# Patient Record
Sex: MALE | Race: WHITE | NOT HISPANIC OR LATINO | Employment: OTHER | URBAN - METROPOLITAN AREA
[De-identification: names, ages, dates, MRNs, and addresses within clinical notes are randomized per-mention and may not be internally consistent; named-entity substitution may affect disease eponyms.]

---

## 2018-10-23 ENCOUNTER — HOSPITAL ENCOUNTER (OUTPATIENT)
Dept: ULTRASOUND IMAGING | Facility: CLINIC | Age: 68
Discharge: HOME | End: 2018-10-23
Attending: ORTHOPAEDIC SURGERY | Admitting: ORTHOPAEDIC SURGERY
Payer: MEDICARE

## 2018-10-23 DIAGNOSIS — R22.41 LOCALIZED SWELLING, MASS, OR LUMP OF RIGHT LOWER EXTREMITY: ICD-10-CM

## 2018-10-23 DIAGNOSIS — I82.409 DVT (DEEP VENOUS THROMBOSIS) (H): ICD-10-CM

## 2018-10-23 DIAGNOSIS — I82.401: ICD-10-CM

## 2018-10-23 PROCEDURE — 93971 EXTREMITY STUDY: CPT | Mod: RT

## 2020-09-13 ENCOUNTER — APPOINTMENT (OUTPATIENT)
Dept: CT IMAGING | Facility: CLINIC | Age: 70
End: 2020-09-13
Attending: EMERGENCY MEDICINE
Payer: COMMERCIAL

## 2020-09-13 ENCOUNTER — HOSPITAL ENCOUNTER (EMERGENCY)
Facility: CLINIC | Age: 70
Discharge: HOME | End: 2020-09-13
Attending: EMERGENCY MEDICINE | Admitting: EMERGENCY MEDICINE
Payer: COMMERCIAL

## 2020-09-13 ENCOUNTER — APPOINTMENT (OUTPATIENT)
Dept: ULTRASOUND IMAGING | Facility: CLINIC | Age: 70
End: 2020-09-13
Attending: EMERGENCY MEDICINE
Payer: COMMERCIAL

## 2020-09-13 VITALS
OXYGEN SATURATION: 94 % | TEMPERATURE: 98.3 F | DIASTOLIC BLOOD PRESSURE: 62 MMHG | SYSTOLIC BLOOD PRESSURE: 119 MMHG | RESPIRATION RATE: 16 BRPM | HEART RATE: 92 BPM

## 2020-09-13 DIAGNOSIS — E11.9 TYPE 2 DIABETES MELLITUS WITHOUT COMPLICATION, WITHOUT LONG-TERM CURRENT USE OF INSULIN (H): ICD-10-CM

## 2020-09-13 DIAGNOSIS — N43.3 BILATERAL HYDROCELE: ICD-10-CM

## 2020-09-13 DIAGNOSIS — D64.9 ANEMIA, UNSPECIFIED TYPE: ICD-10-CM
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
## 2020-09-13 DIAGNOSIS — F10.220 ACUTE ALCOHOLIC INTOXICATION IN ALCOHOLISM WITHOUT COMPLICATION (H): ICD-10-CM

## 2020-09-13 DIAGNOSIS — S01.01XA LACERATION OF SCALP, INITIAL ENCOUNTER: ICD-10-CM

## 2020-09-13 DIAGNOSIS — R60.0 LEG EDEMA, LEFT: ICD-10-CM

## 2020-09-13 LAB
ALBUMIN SERPL-MCNC: 2.8 G/DL (ref 3.4–5)
ALBUMIN UR-MCNC: 10 MG/DL
ALP SERPL-CCNC: 203 U/L (ref 40–150)
ALT SERPL W P-5'-P-CCNC: 38 U/L (ref 0–70)
ANION GAP SERPL CALCULATED.3IONS-SCNC: 8 MMOL/L (ref 3–14)
APPEARANCE UR: CLEAR
AST SERPL W P-5'-P-CCNC: 35 U/L (ref 0–45)
BASOPHILS # BLD AUTO: 0 10E9/L (ref 0–0.2)
BASOPHILS NFR BLD AUTO: 0.1 %
BILIRUB SERPL-MCNC: 0.2 MG/DL (ref 0.2–1.3)
BILIRUB UR QL STRIP: NEGATIVE
BUN SERPL-MCNC: 11 MG/DL (ref 7–30)
CALCIUM SERPL-MCNC: 8.5 MG/DL (ref 8.5–10.1)
CHLORIDE SERPL-SCNC: 105 MMOL/L (ref 94–109)
CO2 SERPL-SCNC: 22 MMOL/L (ref 20–32)
COLOR UR AUTO: YELLOW
CREAT SERPL-MCNC: 0.87 MG/DL (ref 0.66–1.25)
DIFFERENTIAL METHOD BLD: ABNORMAL
EOSINOPHIL # BLD AUTO: 0 10E9/L (ref 0–0.7)
EOSINOPHIL NFR BLD AUTO: 0.1 %
ERYTHROCYTE [DISTWIDTH] IN BLOOD BY AUTOMATED COUNT: 20.3 % (ref 10–15)
ETHANOL SERPL-MCNC: 0.19 G/DL
GFR SERPL CREATININE-BSD FRML MDRD: 88 ML/MIN/{1.73_M2}
GLUCOSE SERPL-MCNC: 121 MG/DL (ref 70–99)
GLUCOSE UR STRIP-MCNC: 70 MG/DL
HCT VFR BLD AUTO: 33.7 % (ref 40–53)
HGB BLD-MCNC: 10.2 G/DL (ref 13.3–17.7)
HGB UR QL STRIP: NEGATIVE
HYALINE CASTS #/AREA URNS LPF: 3 /LPF (ref 0–2)
IMM GRANULOCYTES # BLD: 0 10E9/L (ref 0–0.4)
IMM GRANULOCYTES NFR BLD: 0.2 %
KETONES UR STRIP-MCNC: 10 MG/DL
LEUKOCYTE ESTERASE UR QL STRIP: NEGATIVE
LYMPHOCYTES # BLD AUTO: 1.1 10E9/L (ref 0.8–5.3)
LYMPHOCYTES NFR BLD AUTO: 11.3 %
MCH RBC QN AUTO: 20.4 PG (ref 26.5–33)
MCHC RBC AUTO-ENTMCNC: 30.3 G/DL (ref 31.5–36.5)
MCV RBC AUTO: 67 FL (ref 78–100)
MONOCYTES # BLD AUTO: 0.7 10E9/L (ref 0–1.3)
MONOCYTES NFR BLD AUTO: 7.2 %
MUCOUS THREADS #/AREA URNS LPF: PRESENT /LPF
NEUTROPHILS # BLD AUTO: 7.8 10E9/L (ref 1.6–8.3)
NEUTROPHILS NFR BLD AUTO: 81.1 %
NITRATE UR QL: NEGATIVE
NRBC # BLD AUTO: 0 10*3/UL
NRBC BLD AUTO-RTO: 0 /100
NT-PROBNP SERPL-MCNC: 149 PG/ML (ref 0–900)
PH UR STRIP: 5.5 PH (ref 5–7)
PLATELET # BLD AUTO: 287 10E9/L (ref 150–450)
POTASSIUM SERPL-SCNC: 4.7 MMOL/L (ref 3.4–5.3)
PROT SERPL-MCNC: 5.2 G/DL (ref 6.8–8.8)
RBC # BLD AUTO: 5.01 10E12/L (ref 4.4–5.9)
RBC #/AREA URNS AUTO: 0 /HPF (ref 0–2)
SODIUM SERPL-SCNC: 135 MMOL/L (ref 133–144)
SOURCE: ABNORMAL
SP GR UR STRIP: 1.02 (ref 1–1.03)
TROPONIN I SERPL-MCNC: <0.015 UG/L (ref 0–0.04)
UROBILINOGEN UR STRIP-MCNC: NORMAL MG/DL (ref 0–2)
WBC # BLD AUTO: 9.7 10E9/L (ref 4–11)
WBC #/AREA URNS AUTO: <1 /HPF (ref 0–5)

## 2020-09-13 PROCEDURE — 93971 EXTREMITY STUDY: CPT | Mod: LT

## 2020-09-13 PROCEDURE — 80053 COMPREHEN METABOLIC PANEL: CPT | Performed by: EMERGENCY MEDICINE

## 2020-09-13 PROCEDURE — 80320 DRUG SCREEN QUANTALCOHOLS: CPT | Performed by: EMERGENCY MEDICINE

## 2020-09-13 PROCEDURE — 70450 CT HEAD/BRAIN W/O DYE: CPT

## 2020-09-13 PROCEDURE — 84484 ASSAY OF TROPONIN QUANT: CPT | Performed by: EMERGENCY MEDICINE

## 2020-09-13 PROCEDURE — 99285 EMERGENCY DEPT VISIT HI MDM: CPT | Mod: 25

## 2020-09-13 PROCEDURE — 81001 URINALYSIS AUTO W/SCOPE: CPT | Performed by: EMERGENCY MEDICINE

## 2020-09-13 PROCEDURE — 76870 US EXAM SCROTUM: CPT

## 2020-09-13 PROCEDURE — 83880 ASSAY OF NATRIURETIC PEPTIDE: CPT | Performed by: EMERGENCY MEDICINE

## 2020-09-13 PROCEDURE — 85025 COMPLETE CBC W/AUTO DIFF WBC: CPT | Performed by: EMERGENCY MEDICINE

## 2020-09-13 PROCEDURE — 25000132 ZZH RX MED GY IP 250 OP 250 PS 637: Performed by: EMERGENCY MEDICINE

## 2020-09-13 RX ORDER — FOLIC ACID 1 MG/1
1 TABLET ORAL ONCE
Status: COMPLETED | OUTPATIENT
Start: 2020-09-13 | End: 2020-09-13

## 2020-09-13 RX ORDER — LANOLIN ALCOHOL/MO/W.PET/CERES
100 CREAM (GRAM) TOPICAL ONCE
Status: COMPLETED | OUTPATIENT
Start: 2020-09-13 | End: 2020-09-13

## 2020-09-13 RX ORDER — MAGNESIUM OXIDE 400 MG/1
800 TABLET ORAL ONCE
Status: COMPLETED | OUTPATIENT
Start: 2020-09-13 | End: 2020-09-13

## 2020-09-13 RX ORDER — MULTIVITAMIN,THERAPEUTIC
1 TABLET ORAL ONCE
Status: COMPLETED | OUTPATIENT
Start: 2020-09-13 | End: 2020-09-13

## 2020-09-13 RX ADMIN — THIAMINE HCL TAB 100 MG 100 MG: 100 TAB at 20:28

## 2020-09-13 RX ADMIN — FOLIC ACID 1 MG: 1 TABLET ORAL at 20:28

## 2020-09-13 RX ADMIN — THERA TABS 1 TABLET: TAB at 20:28

## 2020-09-13 RX ADMIN — MAGNESIUM OXIDE 800 MG: 400 TABLET ORAL at 20:28

## 2020-09-13 ASSESSMENT — ENCOUNTER SYMPTOMS
ABDOMINAL PAIN: 0
DIFFICULTY URINATING: 0
FEVER: 0
DIARRHEA: 0
SHORTNESS OF BREATH: 0
COUGH: 0
WOUND: 1
VOMITING: 0
CHILLS: 0
APPETITE CHANGE: 0
NAUSEA: 0
FLANK PAIN: 0

## 2020-09-13 NOTE — ED AVS SNAPSHOT
Emergency Department  [address] [facility] [address]  Phone:  [phone]  Fax:  [phone]                                    Humberto Espinoza   MRN: [id number]    Department:   Emergency Department   Date of Visit:  9/13/2020           After Visit Summary Signature Page    I have received my discharge instructions, and my questions have been answered. I have discussed any challenges I see with this plan with the nurse or doctor.    ..........................................................................................................................................  Patient/Patient Representative Signature      ..........................................................................................................................................  Patient Representative Print Name and Relationship to Patient    ..................................................               ................................................  Date                                   Time    ..........................................................................................................................................  Reviewed by Signature/Title    ...................................................              ..............................................  Date                                               Time          22EPIC Rev 08/18

## 2020-09-14 NOTE — ED NOTES
Bed: ED18  Expected date: 9/13/20  Expected time: 7:14 PM  Means of arrival: Ambulance  Comments:  442 70m fall, head injury, ETOH ETA 1925

## 2020-09-14 NOTE — DISCHARGE INSTRUCTIONS
Keep the wound on your scalp clean, antibiotic ointment for a couple of days.  If you develop a severe headache with recurrent vomiting, return to the emergency room.  No more alcohol, show your wife where you keep your alcohol and give it all to her.  Contact your sponsor from  and get back on the zoom meetings as soon as possible.  Set an appointment up with the primary physician for a recheck of your diabetes and to talk about your alcohol abuse and to get support so you can get sober again.  Set an appointment up with a urologist about your hydroceles in your scrotum.  Cut back on salt in your diet to help reduce the swelling in your left leg.  Follow your diabetic diet closely and monitor your blood sugars.

## 2020-09-14 NOTE — ED PROVIDER NOTES
History     Chief Complaint:  Alcohol Intoxication       HPI  Humberto Espinoza is a 70 year old male with a history of alcohol abuse and drinking vanilla extract who presents for evaluation of alcohol intoxication via EMS.  The wife called the ambulance because she reports that he has fallen a number of times today.  When the paramedics were having him go to the cart, he apparently tripped or lost his balance and fell hitting his head.  There was no loss of consciousness.  He did sustain a small cut on the top of the back of his head.  He denies a headache or neck pain.  The patient admits that he has fallen occasionally and has an old bruise on his right elbow.  He does bring up 2 concerns, 1 is that his testicles bilaterally have been coming more and more swollen over the last few months without significant pain.  He also notices over the last couple of months that his left leg has become more swollen and does go down when he lays down and then gets worse as the day goes on.  He does have diabetes and wonders if that could be part of it.  He denies chest pain or shortness of breath.  No recent fevers or COVID symptoms.  No history of blood clots and he is not on anticoagulants.    Allergies:  Codeine     Medications:   Glipizide   Lisinopril  Metformin  Lantus  Flomax  Celexa  Desyrel  Lipitor  Norvasc  Zestril  Inderalla  Prilosec    Medical History:   Duodenal ulcer  Gastroesophageal reflux disease   Tubular adenoma of colon   Basal cell cancer  Pyelonephritis   Essential hypertension  Type 2 diabetes mellitus   Cholecystitis   Hypercalcemia  Hyponatremia      Surgical History   Cholecystectomy   Lithotripsy   Ureter stent placement  Gastrectomy   Eye surgery     Family History:   Father: prostate cancer  Mother: lung cancer    Social History:  The patient was accompanied to the ED by EMS.  Smoking Status: Never Smoker  Smokeless Tobacco: Never Used  Alcohol Use: Positive  Drug Use: Negative  PCP: Vaishali Dawson  Nicollet Waverly       Review of Systems   Constitutional: Negative for appetite change, chills and fever.   Respiratory: Negative for cough and shortness of breath.    Cardiovascular: Positive for leg swelling. Negative for chest pain.   Gastrointestinal: Negative for abdominal pain, diarrhea, nausea and vomiting.   Genitourinary: Positive for scrotal swelling. Negative for difficulty urinating, flank pain and testicular pain.   Skin: Positive for wound.   All other systems reviewed and are negative.         Physical Exam     Patient Vitals for the past 24 hrs:   BP Temp Pulse Resp SpO2   09/13/20 2224 119/62 -- 92 -- 94 %   09/13/20 2206 -- -- -- -- 97 %   09/13/20 1928 125/75 98.3  F (36.8  C) 94 16 95 %        Physical Exam  Nursing note and vitals reviewed.    Constitutional:  Appears comfortable.    HENT:    Nose normal.  No discharge.  No evidence of facial trauma.  There is a 3 cm partial-thickness laceration over the crown area of his head.  It is not gaping and no active bleeding.     Oral mucosa is dry.  Eyes:    Conjunctivae are normal without injection.     Pupils are equal.  Cardiovascular:  Normal rate, regular rhythm with normal S1 and S2.      Normal heart sounds and peripheral pulses 2+ and equal.       No murmur or sofía.  Pulmonary:  Effort normal and breath sounds clear to auscultation bilaterally.     No respiratory distress.  No stridor.     No wheezes. No rales.   No chest wall tenderness.  GI:    Soft. No distension and no mass. No tenderness.   :   He has bilateral scrotal swelling, bigger and more firm on the right side about the size between a tennis ball and a golf ball, feels like a bag of water on the left, suspicious for hydroocele.  Minimal tenderness.  Musculoskeletal:  Normal range of motion of all 4 extremities.  He does have trace lower extremity edema on the left in the ankle.  None on the right.  Neurological:   Alert and oriented. No focal weakness.     Exhibits good  muscle tone.      GCS eye subscore is 4. GCS verbal subscore is 5.      GCS motor subscore is 6.   Skin:    Skin is warm and dry.  Laceration on the scalp as noted above.  No other rash noted.  He does have an old bruise on his right elbow.  Psychiatric:   Behavior is normal. Appropriate mood and affect.     Judgment and thought content normal.     Emergency Department Course     Imaging:  Radiology results were communicated with the patient and family who voiced understanding of the findings.    CT Head w/o Contrast    1. Unremarkable head CT without acute/subacute intracranial  abnormality.    KAREN CHENG MD  Reading per radiology    US Lower Extremity Venous Duplex Left  1.  No deep venous thrombosis in the left lower extremity.    LORETTA BOCANEGRA MD  Reading per radiology    US Testicular & Scrotum w Doppler Ltd    1. Moderate right and small to moderate left hydroceles.  2. No testicular torsion or acute epididymoorchitis.    LORETTA BOCANEGRA MD  Reading per radiology     Laboratory:  Laboratory findings were communicated with the patient and family who voiced understanding of the findings.    CBC: WBC 9.7, HGB 10.2,   CMP: glucose 121 (H), albumin 2.8 (L), protein total 5.2 (L), alkphos 203 (H) (Creatinine 0.87) o/w WNL   Troponin (Collected 2017): <0.015  Nt inpatient (BNP): 149   UA reflex to micro: urine glc 70 (!), urineketon 10 (!), protein albumin urine 10 (!), mucous present, o/w negative  Alcohol ethyl: 0.19 (H)    Interventions:   2028 thera-vit 1 tab PO   2028 folvite 1 mg PO   2028 thiamine 100 mg PO  2028 magnesium oxide 800 mg PO    Emergency Department Course:  Nursing notes and vitals reviewed. I performed an exam of the patient as documented above.     2017 IV was inserted and blood was drawn for laboratory testing, results above.    2017 The patient provided a urine sample here in the emergency department. This was sent for laboratory testing, findings above.    2047 The patient  was sent for head CT while in the emergency department, results above.     2057 The patient was sent for US while in the emergency department, results above.     2057 The patient was sent for US while in the emergency department, results above.     I called the patient's wife and dicussed patient's presentation, findings, and plan of care.    Findings and plan explained to the Patient. Patient discharged home with instructions regarding supportive care, medications, and reasons to return. The importance of close follow-up was reviewed.    Impression & Plan   Medical Decision Making:  Patient comes in because of alcohol intoxication his wife is concerned because he has fallen more recently.  He does have a walker at home that he is not using.  CT of his head is negative.  He did have a small partial-thickness laceration of the scalp that he did not want fix and I felt it was fine as it will heal on its own.  His blood work came back unremarkable with a normal BNP, troponin, and electrolytes.  Renal function is normal, alkaline phosphatase is elevated at 203 but his ALT and AST are normal.  CBC shows a low hemoglobin of 10.2 but otherwise normal, glucose is slightly elevated at 121.  His UA is unremarkable but alcohol level 0.19.  I did ultrasound his left leg because he talked about it being more swollen than the right and it is only trace edema but it was negative for clot.  Testicular ultrasound confirms bilateral cystoceles.  After a couple of hours, the patient got up and ambulated multiple times to the bathroom and back without assistance and had no trouble.  I spent quite a bit of time on the phone going over the patient's findings and results with his wife after he gave me permission to talk to her.  At this point, the patient denied any suicidal ideation but did admit that he has some depression at times.  I explained to him that he needs to get back in and see his doctor for a recheck of his diabetes and  also to get help with his drinking.  He is going to contact his  sponsor and get back to the AA meetings by [employer].  I also recommended he contact his urologist to get into talk about his cystocele and to get these repaired if indicated.  Apparently when he was walking out to the car he did go to his knees because he said his leg buckled.  When encouraged to come back into the ER to be reassessed, he denied and said he did not hurt himself and he wanted to go home.  He was clinically sober and of sound mind, we could not hold him or make him come back in.  We did encourage him to return if he has further problems and to use his walker when he is at home.  I encouraged his wife to contact his physician as well to be supportive of getting him back into treatment.    Keep the wound on your scalp clean, antibiotic ointment for a couple of days.  If you develop a severe headache with recurrent vomiting, return to the emergency room.  No more alcohol, show your wife where you keep your alcohol and give it all to her.  Contact your sponsor from  and get back on the zoom meetings as soon as possible.  Set an appointment up with the primary physician for a recheck of your diabetes and to talk about your alcohol abuse and to get support so you can get sober again.  Set an appointment up with a urologist about your hydroceles in your scrotum.  Cut back on salt in your diet to help reduce the swelling in your left leg.  Follow your diabetic diet closely and monitor your blood sugars.      Diagnosis:     ICD-10-CM    1. Fall, initial encounter  W19.XXXA    2. Laceration of scalp, initial encounter  S01.01XA    3. Bilateral hydrocele  N43.3    4. Leg edema, left  R60.0    5. Acute alcoholic intoxication in alcoholism without complication (H)  F10.220    6. Anemia, unspecified type  D64.9    7. Type 2 diabetes mellitus without complication, without long-term current use of insulin (H)  E11.9         Disposition:  Discharged to  home.    Discharge Medications:  Discharge Medication List as of 9/13/2020 10:34 PM          Scribe Disclosure:  I, Faith Hooper, am serving as a scribe at 7:33 PM on 9/13/2020 to document services personally performed by Ines Garza based on my observations and the provider's statements to me.     Ines Chappell MD  09/13/20 [id number]

## 2020-09-14 NOTE — ED TRIAGE NOTES
Pt wife called EMS d/t pt having several falls today.  Pt states that he was sober for 28 years until 2015 and since then he has had a few relapses.  Pt states this is d/t financial stress.  Pt was drinking vanilla tonight.

## 2020-09-14 NOTE — ED NOTES
"Pt fell to his knees while ambulating to the car;  Pt denies hitting head.  States, \" my right knee just gave out on me.\"  This writer told pt that we should go back in and let the doctor look at him.  Pt did not want to be seen at this time.  MD aware.  "

## 2022-03-28 ENCOUNTER — HOSPITAL ENCOUNTER (INPATIENT)
Facility: CLINIC | Age: 72
LOS: 4 days | Discharge: HOME | DRG: 638 | End: 2022-04-01
Attending: EMERGENCY MEDICINE | Admitting: HOSPITALIST
Payer: COMMERCIAL

## 2022-03-28 DIAGNOSIS — E16.2 HYPOGLYCEMIA: ICD-10-CM

## 2022-03-28 DIAGNOSIS — F10.920 ALCOHOL INTOXICATION, UNCOMPLICATED (H): ICD-10-CM

## 2022-03-28 LAB
ALBUMIN SERPL-MCNC: 3.4 G/DL (ref 3.4–5)
ALBUMIN UR-MCNC: 50 MG/DL
ALP SERPL-CCNC: 124 U/L (ref 40–150)
ALT SERPL W P-5'-P-CCNC: 24 U/L (ref 0–70)
AMMONIA PLAS-SCNC: 14 UMOL/L (ref 10–50)
ANION GAP SERPL CALCULATED.3IONS-SCNC: 8 MMOL/L (ref 3–14)
APPEARANCE UR: CLEAR
AST SERPL W P-5'-P-CCNC: 24 U/L (ref 0–45)
ATRIAL RATE - MUSE: 67 BPM
BASOPHILS # BLD AUTO: 0 10E3/UL (ref 0–0.2)
BASOPHILS NFR BLD AUTO: 0 %
BILIRUB SERPL-MCNC: 0.5 MG/DL (ref 0.2–1.3)
BILIRUB UR QL STRIP: NEGATIVE
BUN SERPL-MCNC: 10 MG/DL (ref 7–30)
CALCIUM SERPL-MCNC: 9.5 MG/DL (ref 8.5–10.1)
CAOX CRY #/AREA URNS HPF: ABNORMAL /HPF
CHLORIDE BLD-SCNC: 105 MMOL/L (ref 94–109)
CO2 SERPL-SCNC: 21 MMOL/L (ref 20–32)
COLOR UR AUTO: YELLOW
CREAT SERPL-MCNC: 0.89 MG/DL (ref 0.66–1.25)
DIASTOLIC BLOOD PRESSURE - MUSE: NORMAL MMHG
EOSINOPHIL # BLD AUTO: 0 10E3/UL (ref 0–0.7)
EOSINOPHIL NFR BLD AUTO: 0 %
ERYTHROCYTE [DISTWIDTH] IN BLOOD BY AUTOMATED COUNT: 22.9 % (ref 10–15)
ETHANOL SERPL-MCNC: 0.1 G/DL
GFR SERPL CREATININE-BSD FRML MDRD: >90 ML/MIN/1.73M2
GLUCOSE BLD-MCNC: 42 MG/DL (ref 70–99)
GLUCOSE BLDC GLUCOMTR-MCNC: 101 MG/DL (ref 70–99)
GLUCOSE BLDC GLUCOMTR-MCNC: 103 MG/DL (ref 70–99)
GLUCOSE BLDC GLUCOMTR-MCNC: 144 MG/DL (ref 70–99)
GLUCOSE BLDC GLUCOMTR-MCNC: 40 MG/DL (ref 70–99)
GLUCOSE BLDC GLUCOMTR-MCNC: 40 MG/DL (ref 70–99)
GLUCOSE BLDC GLUCOMTR-MCNC: 47 MG/DL (ref 70–99)
GLUCOSE BLDC GLUCOMTR-MCNC: 49 MG/DL (ref 70–99)
GLUCOSE BLDC GLUCOMTR-MCNC: 65 MG/DL (ref 70–99)
GLUCOSE BLDC GLUCOMTR-MCNC: 82 MG/DL (ref 70–99)
GLUCOSE BLDC GLUCOMTR-MCNC: 91 MG/DL (ref 70–99)
GLUCOSE BLDC GLUCOMTR-MCNC: 95 MG/DL (ref 70–99)
GLUCOSE UR STRIP-MCNC: 300 MG/DL
HBA1C MFR BLD: 8 % (ref 0–5.6)
HCT VFR BLD AUTO: 36.6 % (ref 40–53)
HGB BLD-MCNC: 9.8 G/DL (ref 13.3–17.7)
HGB UR QL STRIP: ABNORMAL
HYALINE CASTS: 3 /LPF
IMM GRANULOCYTES # BLD: 0.1 10E3/UL
IMM GRANULOCYTES NFR BLD: 0 %
INR PPP: 1.01 (ref 0.85–1.15)
INTERPRETATION ECG - MUSE: NORMAL
KETONES UR STRIP-MCNC: 10 MG/DL
LACTATE SERPL-SCNC: 3.8 MMOL/L (ref 0.7–2)
LACTATE SERPL-SCNC: 4.7 MMOL/L (ref 0.7–2)
LEUKOCYTE ESTERASE UR QL STRIP: NEGATIVE
LIPASE SERPL-CCNC: 47 U/L (ref 73–393)
LYMPHOCYTES # BLD AUTO: 1.4 10E3/UL (ref 0.8–5.3)
LYMPHOCYTES NFR BLD AUTO: 12 %
MAGNESIUM SERPL-MCNC: 2 MG/DL (ref 1.6–2.3)
MCH RBC QN AUTO: 17 PG (ref 26.5–33)
MCHC RBC AUTO-ENTMCNC: 26.8 G/DL (ref 31.5–36.5)
MCV RBC AUTO: 64 FL (ref 78–100)
MONOCYTES # BLD AUTO: 0.8 10E3/UL (ref 0–1.3)
MONOCYTES NFR BLD AUTO: 6 %
MUCOUS THREADS #/AREA URNS LPF: PRESENT /LPF
NEUTROPHILS # BLD AUTO: 9.5 10E3/UL (ref 1.6–8.3)
NEUTROPHILS NFR BLD AUTO: 82 %
NITRATE UR QL: NEGATIVE
NRBC # BLD AUTO: 0 10E3/UL
NRBC BLD AUTO-RTO: 0 /100
P AXIS - MUSE: 17 DEGREES
PH UR STRIP: 5.5 [PH] (ref 5–7)
PHOSPHATE SERPL-MCNC: 1.1 MG/DL (ref 2.5–4.5)
PLATELET # BLD AUTO: 367 10E3/UL (ref 150–450)
POTASSIUM BLD-SCNC: 3 MMOL/L (ref 3.4–5.3)
PR INTERVAL - MUSE: 152 MS
PROT SERPL-MCNC: 6.5 G/DL (ref 6.8–8.8)
QRS DURATION - MUSE: 170 MS
QT - MUSE: 476 MS
QTC - MUSE: 502 MS
R AXIS - MUSE: -83 DEGREES
RBC # BLD AUTO: 5.75 10E6/UL (ref 4.4–5.9)
RBC URINE: 0 /HPF
SARS-COV-2 RNA RESP QL NAA+PROBE: NEGATIVE
SODIUM SERPL-SCNC: 134 MMOL/L (ref 133–144)
SP GR UR STRIP: 1.02 (ref 1–1.03)
SYSTOLIC BLOOD PRESSURE - MUSE: NORMAL MMHG
T AXIS - MUSE: 40 DEGREES
TROPONIN I SERPL HS-MCNC: 13 NG/L
TSH SERPL DL<=0.005 MIU/L-ACNC: 0.46 MU/L (ref 0.4–4)
UROBILINOGEN UR STRIP-MCNC: NORMAL MG/DL
VENTRICULAR RATE- MUSE: 67 BPM
WBC # BLD AUTO: 11.8 10E3/UL (ref 4–11)
WBC URINE: 3 /HPF

## 2022-03-28 PROCEDURE — 96365 THER/PROPH/DIAG IV INF INIT: CPT

## 2022-03-28 PROCEDURE — 99207 PR NO BILLABLE SERVICE THIS VISIT: CPT | Performed by: NURSE PRACTITIONER

## 2022-03-28 PROCEDURE — C9803 HOPD COVID-19 SPEC COLLECT: HCPCS

## 2022-03-28 PROCEDURE — HZ2ZZZZ DETOXIFICATION SERVICES FOR SUBSTANCE ABUSE TREATMENT: ICD-10-PCS | Performed by: HOSPITALIST

## 2022-03-28 PROCEDURE — 36415 COLL VENOUS BLD VENIPUNCTURE: CPT | Performed by: EMERGENCY MEDICINE

## 2022-03-28 PROCEDURE — 83735 ASSAY OF MAGNESIUM: CPT | Performed by: EMERGENCY MEDICINE

## 2022-03-28 PROCEDURE — 80053 COMPREHEN METABOLIC PANEL: CPT | Performed by: EMERGENCY MEDICINE

## 2022-03-28 PROCEDURE — 81001 URINALYSIS AUTO W/SCOPE: CPT | Performed by: EMERGENCY MEDICINE

## 2022-03-28 PROCEDURE — 250N000009 HC RX 250: Performed by: EMERGENCY MEDICINE

## 2022-03-28 PROCEDURE — 84100 ASSAY OF PHOSPHORUS: CPT | Performed by: HOSPITALIST

## 2022-03-28 PROCEDURE — 258N000001 HC RX 258: Performed by: EMERGENCY MEDICINE

## 2022-03-28 PROCEDURE — 36415 COLL VENOUS BLD VENIPUNCTURE: CPT | Performed by: HOSPITALIST

## 2022-03-28 PROCEDURE — 258N000001 HC RX 258

## 2022-03-28 PROCEDURE — 82140 ASSAY OF AMMONIA: CPT | Performed by: EMERGENCY MEDICINE

## 2022-03-28 PROCEDURE — 258N000003 HC RX IP 258 OP 636: Performed by: EMERGENCY MEDICINE

## 2022-03-28 PROCEDURE — 82040 ASSAY OF SERUM ALBUMIN: CPT | Performed by: EMERGENCY MEDICINE

## 2022-03-28 PROCEDURE — 84443 ASSAY THYROID STIM HORMONE: CPT | Performed by: EMERGENCY MEDICINE

## 2022-03-28 PROCEDURE — 250N000013 HC RX MED GY IP 250 OP 250 PS 637: Performed by: NURSE PRACTITIONER

## 2022-03-28 PROCEDURE — 85610 PROTHROMBIN TIME: CPT | Performed by: EMERGENCY MEDICINE

## 2022-03-28 PROCEDURE — 83605 ASSAY OF LACTIC ACID: CPT | Performed by: EMERGENCY MEDICINE

## 2022-03-28 PROCEDURE — 99223 1ST HOSP IP/OBS HIGH 75: CPT | Mod: AI | Performed by: HOSPITALIST

## 2022-03-28 PROCEDURE — 96361 HYDRATE IV INFUSION ADD-ON: CPT

## 2022-03-28 PROCEDURE — 96376 TX/PRO/DX INJ SAME DRUG ADON: CPT

## 2022-03-28 PROCEDURE — 85025 COMPLETE CBC W/AUTO DIFF WBC: CPT | Performed by: EMERGENCY MEDICINE

## 2022-03-28 PROCEDURE — 93005 ELECTROCARDIOGRAM TRACING: CPT

## 2022-03-28 PROCEDURE — 96368 THER/DIAG CONCURRENT INF: CPT

## 2022-03-28 PROCEDURE — 83036 HEMOGLOBIN GLYCOSYLATED A1C: CPT | Performed by: HOSPITALIST

## 2022-03-28 PROCEDURE — 258N000001 HC RX 258: Performed by: HOSPITALIST

## 2022-03-28 PROCEDURE — U0005 INFEC AGEN DETEC AMPLI PROBE: HCPCS | Performed by: EMERGENCY MEDICINE

## 2022-03-28 PROCEDURE — 250N000011 HC RX IP 250 OP 636: Performed by: EMERGENCY MEDICINE

## 2022-03-28 PROCEDURE — 84484 ASSAY OF TROPONIN QUANT: CPT | Performed by: EMERGENCY MEDICINE

## 2022-03-28 PROCEDURE — 250N000013 HC RX MED GY IP 250 OP 250 PS 637: Performed by: HOSPITALIST

## 2022-03-28 PROCEDURE — 82077 ASSAY SPEC XCP UR&BREATH IA: CPT | Performed by: EMERGENCY MEDICINE

## 2022-03-28 PROCEDURE — 83690 ASSAY OF LIPASE: CPT | Performed by: EMERGENCY MEDICINE

## 2022-03-28 PROCEDURE — 120N000001 HC R&B MED SURG/OB

## 2022-03-28 PROCEDURE — 99285 EMERGENCY DEPT VISIT HI MDM: CPT | Mod: 25

## 2022-03-28 RX ORDER — AMLODIPINE BESYLATE 2.5 MG/1
2.5 TABLET ORAL DAILY
COMMUNITY

## 2022-03-28 RX ORDER — TRAZODONE HYDROCHLORIDE 50 MG/1
50 TABLET, FILM COATED ORAL AT BEDTIME
Status: DISCONTINUED | OUTPATIENT
Start: 2022-03-28 | End: 2022-04-01 | Stop reason: HOSPADM

## 2022-03-28 RX ORDER — LISINOPRIL 30 MG/1
30 TABLET ORAL DAILY
COMMUNITY

## 2022-03-28 RX ORDER — ATORVASTATIN CALCIUM 20 MG/1
20 TABLET, FILM COATED ORAL DAILY
COMMUNITY

## 2022-03-28 RX ORDER — ACETAMINOPHEN 650 MG/1
650 SUPPOSITORY RECTAL EVERY 6 HOURS PRN
Status: DISCONTINUED | OUTPATIENT
Start: 2022-03-28 | End: 2022-04-01 | Stop reason: HOSPADM

## 2022-03-28 RX ORDER — POLYETHYLENE GLYCOL 3350 17 G/17G
17 POWDER, FOR SOLUTION ORAL DAILY PRN
Status: DISCONTINUED | OUTPATIENT
Start: 2022-03-28 | End: 2022-04-01 | Stop reason: HOSPADM

## 2022-03-28 RX ORDER — ONDANSETRON 2 MG/ML
4 INJECTION INTRAMUSCULAR; INTRAVENOUS EVERY 6 HOURS PRN
Status: DISCONTINUED | OUTPATIENT
Start: 2022-03-28 | End: 2022-04-01 | Stop reason: HOSPADM

## 2022-03-28 RX ORDER — DIAZEPAM 10 MG/2ML
5-10 INJECTION, SOLUTION INTRAMUSCULAR; INTRAVENOUS EVERY 30 MIN PRN
Status: DISCONTINUED | OUTPATIENT
Start: 2022-03-28 | End: 2022-04-01 | Stop reason: HOSPADM

## 2022-03-28 RX ORDER — TRAZODONE HYDROCHLORIDE 50 MG/1
50 TABLET, FILM COATED ORAL AT BEDTIME
COMMUNITY

## 2022-03-28 RX ORDER — ONDANSETRON 4 MG/1
4 TABLET, ORALLY DISINTEGRATING ORAL EVERY 6 HOURS PRN
Status: DISCONTINUED | OUTPATIENT
Start: 2022-03-28 | End: 2022-04-01 | Stop reason: HOSPADM

## 2022-03-28 RX ORDER — DIAZEPAM 5 MG
10 TABLET ORAL EVERY 30 MIN PRN
Status: DISCONTINUED | OUTPATIENT
Start: 2022-03-28 | End: 2022-04-01 | Stop reason: HOSPADM

## 2022-03-28 RX ORDER — PROCHLORPERAZINE MALEATE 5 MG
5 TABLET ORAL EVERY 6 HOURS PRN
Status: DISCONTINUED | OUTPATIENT
Start: 2022-03-28 | End: 2022-04-01 | Stop reason: HOSPADM

## 2022-03-28 RX ORDER — LIDOCAINE 40 MG/G
CREAM TOPICAL
Status: DISCONTINUED | OUTPATIENT
Start: 2022-03-28 | End: 2022-04-01 | Stop reason: HOSPADM

## 2022-03-28 RX ORDER — AMOXICILLIN 250 MG
1 CAPSULE ORAL 2 TIMES DAILY PRN
Status: DISCONTINUED | OUTPATIENT
Start: 2022-03-28 | End: 2022-04-01 | Stop reason: HOSPADM

## 2022-03-28 RX ORDER — AMOXICILLIN 250 MG
2 CAPSULE ORAL 2 TIMES DAILY PRN
Status: DISCONTINUED | OUTPATIENT
Start: 2022-03-28 | End: 2022-04-01 | Stop reason: HOSPADM

## 2022-03-28 RX ORDER — NICOTINE POLACRILEX 4 MG
15-30 LOZENGE BUCCAL
Status: DISCONTINUED | OUTPATIENT
Start: 2022-03-28 | End: 2022-04-01 | Stop reason: HOSPADM

## 2022-03-28 RX ORDER — OLANZAPINE 5 MG/1
5-10 TABLET, ORALLY DISINTEGRATING ORAL EVERY 6 HOURS PRN
Status: DISCONTINUED | OUTPATIENT
Start: 2022-03-28 | End: 2022-04-01 | Stop reason: HOSPADM

## 2022-03-28 RX ORDER — PANTOPRAZOLE SODIUM 20 MG/1
20 TABLET, DELAYED RELEASE ORAL DAILY
Status: DISCONTINUED | OUTPATIENT
Start: 2022-03-29 | End: 2022-04-01 | Stop reason: HOSPADM

## 2022-03-28 RX ORDER — DEXTROSE MONOHYDRATE 25 G/50ML
50 INJECTION, SOLUTION INTRAVENOUS ONCE
Status: COMPLETED | OUTPATIENT
Start: 2022-03-28 | End: 2022-03-28

## 2022-03-28 RX ORDER — CITALOPRAM HYDROBROMIDE 20 MG/1
20 TABLET ORAL DAILY
Status: DISCONTINUED | OUTPATIENT
Start: 2022-03-29 | End: 2022-04-01 | Stop reason: HOSPADM

## 2022-03-28 RX ORDER — ACETAMINOPHEN 325 MG/1
650 TABLET ORAL EVERY 6 HOURS PRN
Status: DISCONTINUED | OUTPATIENT
Start: 2022-03-28 | End: 2022-04-01 | Stop reason: HOSPADM

## 2022-03-28 RX ORDER — PROPRANOLOL HCL 60 MG
60 CAPSULE, EXTENDED RELEASE 24HR ORAL DAILY
COMMUNITY

## 2022-03-28 RX ORDER — ATORVASTATIN CALCIUM 10 MG/1
20 TABLET, FILM COATED ORAL DAILY
Status: DISCONTINUED | OUTPATIENT
Start: 2022-03-29 | End: 2022-04-01 | Stop reason: HOSPADM

## 2022-03-28 RX ORDER — PROPRANOLOL HCL 60 MG
60 CAPSULE, EXTENDED RELEASE 24HR ORAL DAILY
Status: DISCONTINUED | OUTPATIENT
Start: 2022-03-29 | End: 2022-04-01 | Stop reason: HOSPADM

## 2022-03-28 RX ORDER — HALOPERIDOL 5 MG/ML
2.5-5 INJECTION INTRAMUSCULAR EVERY 6 HOURS PRN
Status: DISCONTINUED | OUTPATIENT
Start: 2022-03-28 | End: 2022-04-01 | Stop reason: HOSPADM

## 2022-03-28 RX ORDER — IBUPROFEN 200 MG
200-400 TABLET ORAL EVERY 6 HOURS PRN
Status: DISCONTINUED | OUTPATIENT
Start: 2022-03-28 | End: 2022-04-01 | Stop reason: HOSPADM

## 2022-03-28 RX ORDER — TAMSULOSIN HYDROCHLORIDE 0.4 MG/1
0.4 CAPSULE ORAL DAILY
COMMUNITY

## 2022-03-28 RX ORDER — CITALOPRAM HYDROBROMIDE 20 MG/1
20 TABLET ORAL DAILY
COMMUNITY

## 2022-03-28 RX ORDER — TAMSULOSIN HYDROCHLORIDE 0.4 MG/1
0.4 CAPSULE ORAL DAILY
Status: DISCONTINUED | OUTPATIENT
Start: 2022-03-29 | End: 2022-04-01 | Stop reason: HOSPADM

## 2022-03-28 RX ORDER — DEXTROSE MONOHYDRATE 25 G/50ML
25-50 INJECTION, SOLUTION INTRAVENOUS
Status: DISCONTINUED | OUTPATIENT
Start: 2022-03-28 | End: 2022-04-01 | Stop reason: HOSPADM

## 2022-03-28 RX ORDER — FLUMAZENIL 0.1 MG/ML
0.2 INJECTION, SOLUTION INTRAVENOUS
Status: DISCONTINUED | OUTPATIENT
Start: 2022-03-28 | End: 2022-04-01 | Stop reason: HOSPADM

## 2022-03-28 RX ORDER — PROCHLORPERAZINE 25 MG
12.5 SUPPOSITORY, RECTAL RECTAL EVERY 12 HOURS PRN
Status: DISCONTINUED | OUTPATIENT
Start: 2022-03-28 | End: 2022-04-01 | Stop reason: HOSPADM

## 2022-03-28 RX ORDER — DEXTROSE MONOHYDRATE 100 MG/ML
INJECTION, SOLUTION INTRAVENOUS CONTINUOUS
Status: DISCONTINUED | OUTPATIENT
Start: 2022-03-28 | End: 2022-03-30

## 2022-03-28 RX ORDER — FOLIC ACID 1 MG/1
1 TABLET ORAL DAILY
Status: DISCONTINUED | OUTPATIENT
Start: 2022-03-29 | End: 2022-04-01 | Stop reason: HOSPADM

## 2022-03-28 RX ORDER — AMLODIPINE BESYLATE 2.5 MG/1
2.5 TABLET ORAL DAILY
Status: DISCONTINUED | OUTPATIENT
Start: 2022-03-29 | End: 2022-04-01 | Stop reason: HOSPADM

## 2022-03-28 RX ORDER — DEXTROSE MONOHYDRATE 25 G/50ML
INJECTION, SOLUTION INTRAVENOUS
Status: COMPLETED
Start: 2022-03-28 | End: 2022-03-28

## 2022-03-28 RX ORDER — MULTIPLE VITAMINS W/ MINERALS TAB 9MG-400MCG
1 TAB ORAL DAILY
Status: DISCONTINUED | OUTPATIENT
Start: 2022-03-29 | End: 2022-04-01 | Stop reason: HOSPADM

## 2022-03-28 RX ORDER — DEXTROSE MONOHYDRATE 100 MG/ML
INJECTION, SOLUTION INTRAVENOUS CONTINUOUS
Status: DISCONTINUED | OUTPATIENT
Start: 2022-03-28 | End: 2022-03-29

## 2022-03-28 RX ADMIN — DEXTROSE 15 G: 15 GEL ORAL at 21:36

## 2022-03-28 RX ADMIN — IBUPROFEN 200 MG: 200 TABLET, FILM COATED ORAL at 23:46

## 2022-03-28 RX ADMIN — TRAZODONE HYDROCHLORIDE 50 MG: 50 TABLET ORAL at 21:38

## 2022-03-28 RX ADMIN — DEXTROSE 30 G: 15 GEL ORAL at 23:41

## 2022-03-28 RX ADMIN — DEXTROSE MONOHYDRATE 50 ML: 25 INJECTION, SOLUTION INTRAVENOUS at 19:26

## 2022-03-28 RX ADMIN — DEXTROSE AND SODIUM CHLORIDE: 5; 450 INJECTION, SOLUTION INTRAVENOUS at 15:56

## 2022-03-28 RX ADMIN — FOLIC ACID: 5 INJECTION, SOLUTION INTRAMUSCULAR; INTRAVENOUS; SUBCUTANEOUS at 16:46

## 2022-03-28 RX ADMIN — DEXTROSE MONOHYDRATE: 100 INJECTION, SOLUTION INTRAVENOUS at 17:42

## 2022-03-28 RX ADMIN — DEXTROSE MONOHYDRATE 50 ML: 25 INJECTION, SOLUTION INTRAVENOUS at 17:05

## 2022-03-28 ASSESSMENT — ENCOUNTER SYMPTOMS
NAUSEA: 0
ABDOMINAL PAIN: 0
VOMITING: 0
SHORTNESS OF BREATH: 0
DIARRHEA: 0

## 2022-03-28 ASSESSMENT — ACTIVITIES OF DAILY LIVING (ADL)
FALL_HISTORY_WITHIN_LAST_SIX_MONTHS: NO
ADLS_ACUITY_SCORE: 12
DIFFICULTY_EATING/SWALLOWING: NO
WEAR_GLASSES_OR_BLIND: YES
DIFFICULTY_COMMUNICATING: NO
ADLS_ACUITY_SCORE: 12
WALKING_OR_CLIMBING_STAIRS_DIFFICULTY: NO
ADLS_ACUITY_SCORE: 6
DOING_ERRANDS_INDEPENDENTLY_DIFFICULTY: NO
TOILETING_ISSUES: NO
ADLS_ACUITY_SCORE: 12
DRESSING/BATHING_DIFFICULTY: NO
CONCENTRATING,_REMEMBERING_OR_MAKING_DECISIONS_DIFFICULTY: NO
VISION_MANAGEMENT: GLASSES AT HOME
CHANGE_IN_FUNCTIONAL_STATUS_SINCE_ONSET_OF_CURRENT_ILLNESS/INJURY: NO
ADLS_ACUITY_SCORE: 6
ADLS_ACUITY_SCORE: 12
HEARING_DIFFICULTY_OR_DEAF: NO

## 2022-03-28 NOTE — PHARMACY-ADMISSION MEDICATION HISTORY
Pharmacy Medication History  Admission medication history interview status for the 3/28/2022  admission is complete. See EPIC admission navigator for prior to admission medications     Location of Interview: Patient room  Medication history sources: Patient, Surescripts and Care Everywhere    Significant changes made to the medication list:    Added all medications.     Additional medication history information:    Novolin 70/30: pt stated they take 20-30 units BID of this medication. They obtain their insulin OTC at [facility]. Called [facility] and confirmed they sell Novolin 70/30 OTC. Cannot find any notes documenting patient should be on insulin or what dosing to use (only have [employer] info at this time).     Medication reconciliation completed by provider prior to medication history? Yes    Time spent in this activity: 30 minutes     Prior to Admission medications    Medication Sig Last Dose Taking? Auth Provider   amLODIPine (NORVASC) 2.5 MG tablet Take 2.5 mg by mouth daily 3/28/2022 at am Yes Unknown, Entered By History   atorvastatin (LIPITOR) 20 MG tablet Take 20 mg by mouth daily 3/28/2022 at am Yes Unknown, Entered By History   citalopram (CELEXA) 20 MG tablet Take 20 mg by mouth daily 3/28/2022 at am Yes Unknown, Entered By History   Insulin NPH Isophane & Regular (NOVOLIN 70/30 FLEXPEN SC) Inject 20-30 Units Subcutaneous 2 times daily 3/28/2022 at am Yes Unknown, Entered By History   lisinopril (ZESTRIL) 30 MG tablet Take 30 mg by mouth daily 3/28/2022 at am Yes Unknown, Entered By History   metFORMIN (GLUCOPHAGE) 1000 MG tablet Take 1,000 mg by mouth 2 times daily (with meals) 3/28/2022 at am Yes Unknown, Entered By History   omeprazole (PRILOSEC) 20 MG DR capsule Take 20 mg by mouth daily 3/28/2022 at am Yes Unknown, Entered By History   propranolol ER (INDERAL LA) 60 MG 24 hr capsule Take 60 mg by mouth daily 3/28/2022 at am Yes Unknown, Entered By History   tamsulosin (FLOMAX) 0.4 MG capsule Take  0.4 mg by mouth daily 3/28/2022 at am Yes Unknown, Entered By History   traZODone (DESYREL) 50 MG tablet Take 50 mg by mouth At Bedtime 3/27/2022 at hs Yes Unknown, Entered By History       The information provided in this note is only as accurate as the sources available at the time of update(s)     Rachana Carrillo, PharmD, BCCCP

## 2022-03-28 NOTE — ED PROVIDER NOTES
History   Chief Complaint:  Hypoglycemia       The history is provided by the patient.      Humberto Espinoza is a 71 year old male with a history of type 2 diabetes mellitus and alcohol abuse who presents via EMS with hypoglycemia. Earlier today, he was hallucinating at home and was found to have a blood sugar less than 40. This prompted his wife to called EMS. His blood sugars initially improved when EMS arrived, but it dropped down to 52 while en route to the ED, despite being provided an amp of D50. His last measured blood sugar level from EMS was 124. He does not remember much from earlier, but endorses drinking mouth wash today. He has been drinking for the past couple weeks because he is a recovering alcoholic. He claims that he took his insulin at approximately 0730 this morning. He claims to be eating and drinking appropriately. He denies chest pain, shortness of breath, abdominal pain, nausea, vomiting, or diarrhea.     Review of Systems   Respiratory: Negative for shortness of breath.    Cardiovascular: Negative for chest pain.   Gastrointestinal: Negative for abdominal pain, diarrhea, nausea and vomiting.   All other systems reviewed and are negative.    Allergies:  No Known Allergies    Medications:  Glipizide   Lisinopril   Propanolol   Zestril   Omeprazole   Desyrel   Celexa   Lipitor   Metformin   Flomax     Past Medical History:     Duodenal ulcer   GERD   Colon tubular adenoma   Pyelonephritis   Right bundle branch block   Essential HTN   Gastric ulcer   Type 2 DM   BPH w/ lower obstruction/lower urinary tract sx   DKA   Noninfectious gastroenteritis and colitis   Hyponatremia   Hypercalcemia   Peptic ulcer   Cholecystitis   Iron deficiency anemia     Past Surgical History:    Cholecystectomy   Lithotripsy   Ureter stent insertion   Gastrectomy   MOHS procedure      Family History:    Father - prostate cancer  Mother - lung cancer     Social History:  Patient presents to the ED alone via EMS  Hx of  "alcohol abuse   Lives at home with his wife    Physical Exam     Patient Vitals for the past 24 hrs:   BP Pulse Resp SpO2 Height Weight   03/28/22 1645 -- -- -- 98 % -- --   03/28/22 1630 117/61 67 -- 99 % -- --   03/28/22 1553 (!) 163/87 77 18 99 % 1.803 m (5' 11\") 88.5 kg (195 lb)       Physical Exam  Nursing note and vitals reviewed.  Constitutional:  Cooperative. Somewhat sleepy, easily arousable.  HENT:   Nose:    Nose normal.   Mouth/Throat:   Mucous membranes are normal.   Eyes:    Conjunctivae normal and EOM are normal.      Pupils are equal, round, and reactive to light.   Neck:    Trachea normal.   Cardiovascular:  Normal rate, regular rhythm, normal heart sounds and normal pulses. No murmur heard.  Pulmonary/Chest:  Effort normal and breath sounds normal.   Abdominal:   Soft. Normal appearance and bowel sounds are normal.      There is no tenderness.      There is no rebound and no CVA tenderness.   Musculoskeletal:  Extremities atraumatic x 4.   Lymphadenopathy:  No cervical adenopathy.   Neurological:   Somewhat sleepy but easily arousable normal strength.      No cranial nerve deficit or sensory deficit. GCS eye subscore is 4. GCS verbal subscore is 5. GCS motor subscore is 6.   Skin:    Skin is intact. No rash noted.   Psychiatric:   Somewhat sleepy but otherwise normal mood and affect.    Emergency Department Course   ECG  ECG obtained at 1608, ECG read at 1615  Normal sinus rhythm   Right bundle branch block   Left anterior fascicular block  Bifascicular block   Abnormal ECG   Rate 67 bpm. HI interval 152 ms. QRS duration 170 ms. QT/QTc 476/502 ms. P-R-T axes 17 -83 40.     Laboratory:  Labs Ordered and Resulted from Time of ED Arrival to Time of ED Departure   COMPREHENSIVE METABOLIC PANEL - Abnormal       Result Value    Sodium 134      Potassium 3.0 (*)     Chloride 105      Carbon Dioxide (CO2) 21      Anion Gap 8      Urea Nitrogen 10      Creatinine 0.89      Calcium 9.5      Glucose 42 (*)  "    Alkaline Phosphatase 124      AST 24      ALT 24      Protein Total 6.5 (*)     Albumin 3.4      Bilirubin Total 0.5      GFR Estimate >90     LIPASE - Abnormal    Lipase 47 (*)    LACTIC ACID WHOLE BLOOD - Abnormal    Lactic Acid 3.8 (*)    ETHYL ALCOHOL LEVEL - Abnormal    Alcohol ethyl 0.10 (*)    CBC WITH PLATELETS AND DIFFERENTIAL - Abnormal    WBC Count 11.8 (*)     RBC Count 5.75      Hemoglobin 9.8 (*)     Hematocrit 36.6 (*)     MCV 64 (*)     MCH 17.0 (*)     MCHC 26.8 (*)     RDW 22.9 (*)     Platelet Count 367      % Neutrophils 82      % Lymphocytes 12      % Monocytes 6      % Eosinophils 0      % Basophils 0      % Immature Granulocytes 0      NRBCs per 100 WBC 0      Absolute Neutrophils 9.5 (*)     Absolute Lymphocytes 1.4      Absolute Monocytes 0.8      Absolute Eosinophils 0.0      Absolute Basophils 0.0      Absolute Immature Granulocytes 0.1      Absolute NRBCs 0.0     GLUCOSE BY METER - Abnormal    GLUCOSE BY METER POCT 40 (*)    GLUCOSE BY METER - Abnormal    GLUCOSE BY METER POCT 101 (*)    INR - Normal    INR 1.01     TROPONIN I - Normal    Troponin I High Sensitivity 13     COVID-19 VIRUS (CORONAVIRUS) BY PCR - Normal    SARS CoV2 PCR Negative     MAGNESIUM - Normal    Magnesium 2.0     AMMONIA - Normal    Ammonia 14     TSH WITH FREE T4 REFLEX - Normal    TSH 0.46     ROUTINE UA WITH MICROSCOPIC REFLEX TO CULTURE   GLUCOSE MONITOR NURSING POCT      Emergency Department Course:    Reviewed:  I reviewed nursing notes, vitals and past medical history    Assessments:  1537 I obtained history and examined the patient as noted above. I discussed plan for admission to the hospital.    Consults:  1463 I spoke with Dr. Greenwood from [facility], who accepts the patient for admission.    Interventions:  1556 Dextrose 5% and 0.45% NaCl IV  1646 NaCl w/ Infuvite Adult 10 mL, thiamine 100 mg, folic acid 1 mg, magnesium sulfate 2 g IV   1705 Dextrose 50 ml IV     Disposition:  The patient was  admitted to the hospital under the care of Dr. Greenwood.     Impression & Plan   Medical Decision Making:  This is a 71-year-old male brought in by EMS for hypoglycemia.  He also has been drinking mouthwash rather than alcohol apparently.  I proceeded with the above work-up, and he was started on D5 half normal saline.  However his blood sugar dropped again to 40, and therefore he was provided another amp of D50.  We also started him on an infusion of D10.  Additionally, he was provided a banana bag due to my concerns about malnutrition.  I subsequently spoke with Dr. Greenwood, who will be admitting him.    Diagnosis:    ICD-10-CM    1. Hypoglycemia  E16.2    2. Alcohol intoxication, uncomplicated (H)  F10.920        Scribe Disclosure:  I, Mike Marcus, am serving as a scribe at 3:44 PM on 3/28/2022 to document services personally performed by Mark Flores MD based on my observations and the provider's statements to me.        Mark Flores MD  03/28/22 4495

## 2022-03-28 NOTE — H&P
"[facility]    History and Physical  Hospitalist       Date of Admission:  3/28/2022    Assessment & Plan   Humberto Espinoza is a 71 year old male who presents with symptomatic hypoglycemia    Symptomatic Hypoglycemia  Diabetes mellitus type 2  Lactic acidosis secondary to likely alcohol related acidosis  PTA novolin 70/30 20 units BID and metformin. He has been decreasing his dose of novolin lately due to lower BGs. States his goal is BG<180. Requiring D10 in ED to maintain BGs 80-90s. Last novolin was 3/28 AM.  *addendum: BG dropped again to 47 at 7pm, D10 infusion increased to 100ml/hr.   - monitor BGs q2h to start, once BGs stable and >90 consecutively, will switch to Q4h checks overnight, then resume ACHS  - regular diet  - D10 at 100ml/hr  - no plans for sliding scale insulin at this time while monitoring BGs  - hold novolin and metformin  - recheck lactic acid was 4.7--could be secondary to metformin vs hypoglycemia-- trend lactic acid in AM. He does not look acutely ill or septic. No abx planned. UA WNL    Hallucinations  Likely secondary to hypoglycemia, resolved in ED    Hypokalemia  Replete per protocol    Alcohol use disorder  Alcohol intoxication   Currently drinking 16oz mouthwash daily for the last 14 days. Alcohol level 0.1 on admit  - CIWA available  - daily MVI/folate/thiamine  - he reports strong support system with AA that he will follow up with    Hypertension  Hyperlipidemia  Continue PTA amlodipine, propranolol, lisinopril with hold parameters  Continue statin    GERD  Peptic ulcer disease  Hx partial gastrectomy  Continue PTA omeprazole    BPH  UA WNL  Continue flomax    Depression  Continue PTA celexa, trazodone at bedtime    Clinically Significant Risk Factors Present on Admission                  # Overweight: Estimated body mass index is 27.2 kg/m  as calculated from the following:    Height as of this encounter: 1.803 m (5' 11\").    Weight as of this encounter: " 88.5 kg (195 lb).        DVT Prophylaxis: Pneumatic Compression Devices  Code Status: Full Code  Expected Discharge:  1-2 days   Anticipated discharge location:  Awaiting care coordination huddle  Delays:     stable BGs      Nakita Greenwood DO    Primary Care Physician   [facility]    Chief Complaint   hypoglycemia    History is obtained from the patient, prior record review    History of Present Illness   Humberto Espinoza is a 71 year old male who presents with report of hallucinations at home, found to have BG <40 prompting his wife to call EMS. He reports having a normal morning, eating oatmeal for breakfast and then taking his medications including 20 units novolin. BG was 52 en route to ED after given amp of D50. BGs had been in 40s in ED and required initiation of D10. He reports that his BGs have been lower in the past two weeks since he started drinking mouthwash regularly and he has been decreasing his insulin from 30 units BID to 20 units BID, he is unable to tell me how low his BGs have gone. He reports normal intake including food/water. He reports life being more difficult in the past two weeks prompting his mouthwash drinking. He reports good support system and declines need for CD counselor.  Denies chest pain, shortness of breath, nausea, vomiting, fevers, chills.    Past Medical History    I have reviewed this patient's medical history and updated it with pertinent information if needed.   Past Medical History:   Diagnosis Date     Duodenal ulcer    Gastroesophageal reflux disease   Tubular adenoma of colon   Basal cell cancer  Pyelonephritis   Essential hypertension  Type 2 diabetes mellitus   Cholecystitis   Hypercalcemia  Hyponatremia     Past Surgical History   Cholecystectomy   Lithotripsy   Ureter stent placement  Gastrectomy   Eye surgery     Prior to Admission Medications   Prior to Admission Medications   Prescriptions Last Dose Informant Patient Reported? Taking?    Insulin NPH Isophane & Regular (NOVOLIN 70/30 FLEXPEN SC) 3/28/2022 at am  Yes Yes   Sig: Inject 20-30 Units Subcutaneous 2 times daily   amLODIPine (NORVASC) 2.5 MG tablet 3/28/2022 at am  Yes Yes   Sig: Take 2.5 mg by mouth daily   atorvastatin (LIPITOR) 20 MG tablet 3/28/2022 at am  Yes Yes   Sig: Take 20 mg by mouth daily   citalopram (CELEXA) 20 MG tablet 3/28/2022 at am  Yes Yes   Sig: Take 20 mg by mouth daily   lisinopril (ZESTRIL) 30 MG tablet 3/28/2022 at am  Yes Yes   Sig: Take 30 mg by mouth daily   metFORMIN (GLUCOPHAGE) 1000 MG tablet 3/28/2022 at am  Yes Yes   Sig: Take 1,000 mg by mouth 2 times daily (with meals)   omeprazole (PRILOSEC) 20 MG DR capsule 3/28/2022 at am  Yes Yes   Sig: Take 20 mg by mouth daily   propranolol ER (INDERAL LA) 60 MG 24 hr capsule 3/28/2022 at am  Yes Yes   Sig: Take 60 mg by mouth daily   tamsulosin (FLOMAX) 0.4 MG capsule 3/28/2022 at am  Yes Yes   Sig: Take 0.4 mg by mouth daily   traZODone (DESYREL) 50 MG tablet 3/27/2022 at hs  Yes Yes   Sig: Take 50 mg by mouth At Bedtime      Facility-Administered Medications: None     Allergies   No Known Allergies    Social History   Never smoker. Use to drink alcohol, was seen in ED sept 2020 for drinking vanilla extract. Now drinks mouthwash    Family History   Father: prostate cancer  Mother: lung cancer    Review of Systems   The 10 point Review of Systems is negative other than noted in the HPI    Physical Exam       BP: 117/61 Pulse: 67   Resp: 18 SpO2: 98 % O2 Device: Nasal cannula Oxygen Delivery: 2 LPM  Vital Signs with Ranges  Pulse:  [67-77] 67  Resp:  [18] 18  BP: (117-163)/(61-87) 117/61  SpO2:  [98 %-99 %] 98 %  195 lbs 0 oz    Constitutional: Awake, alert, cooperative, no apparent distress.  Eyes: Conjunctiva and pupils examined and normal.  HEENT: Moist mucous membranes, normal dentition.  Respiratory: Clear to auscultation bilaterally, no crackles or wheezing.  Cardiovascular: Regular rate and rhythm, normal S1  and S2, and no murmur noted.  GI: Soft, non-distended, non-tender, normal bowel sounds.  Lymph/Hematologic: No anterior cervical or supraclavicular adenopathy.  Skin: No rashes, no cyanosis, no edema.  Musculoskeletal: No joint swelling, erythema or tenderness.  Neurologic: Cranial nerves 2-12 intact, normal strength and sensation.  Psychiatric: Alert, oriented to person, place and time, no obvious anxiety or depression. Denies hallucinations    Data   Data reviewed today:  I personally reviewed EKG: NSR, RBBB, LAFB  Recent Labs   Lab 03/28/22  1820 03/28/22  1748 03/28/22  1701 03/28/22  1623 03/28/22  1551   WBC  --   --   --   --  11.8*   HGB  --   --   --   --  9.8*   MCV  --   --   --   --  64*   PLT  --   --   --   --  367   INR  --   --   --   --  1.01   NA  --   --   --   --  134   POTASSIUM  --   --   --   --  3.0*   CHLORIDE  --   --   --   --  105   CO2  --   --   --   --  21   BUN  --   --   --   --  10   CR  --   --   --   --  0.89   ANIONGAP  --   --   --   --  8   REBECA  --   --   --   --  9.5   GLC 82 95 40*   < > 42*   ALBUMIN  --   --   --   --  3.4   PROTTOTAL  --   --   --   --  6.5*   BILITOTAL  --   --   --   --  0.5   ALKPHOS  --   --   --   --  124   ALT  --   --   --   --  24   AST  --   --   --   --  24   LIPASE  --   --   --   --  47*    < > = values in this interval not displayed.       No results found for this or any previous visit (from the past 24 hour(s)).

## 2022-03-28 NOTE — ED NOTES
"[facility]  ED Nurse Handoff Report    ED Chief complaint: Hypoglycemia      ED Diagnosis:   Final diagnoses:   Hypoglycemia   Alcohol intoxication, uncomplicated (H)       Code Status: Full Code    Allergies: No Known Allergies    Patient Story: hypoglycemic  Focused Assessment:  71 year old male with history of type 2 diabetes mellitus and alcohol abuse who presents with hypoglycemia. Earlier today, he was hallucinating at home and was found to have a blood sugar less than 40. This prompted his wife to called EMS. His blood sugars initially improved when EMS arrived, but it dropped down to 52 while en route to the ED. His last measured blood sugar level from EMS was 124. He does not remember much from earlier, but endorses drinking mouth wash today. He has been drinking for the past couple weeks because he is a recovering alcoholic. He claims that he took his insulin at approximately 0730 this morning. He claims to be eating and drinking appropriately    Treatments and/or interventions provided: D50 x2, D10 drip, banana bag  Patient's response to treatments and/or interventions:       To be done/followed up on inpatient unit:  monitor    Does this patient have any cognitive concerns?: Short term memory loss and Alcohol/Drugs temporary cognitive concerns    Activity level - Baseline/Home:  Independent  Activity Level - Current:   Stand with Assist    Patient's Preferred language: English   Needed?: No    Isolation: None  Infection: Not Applicable  Patient tested for COVID 19 prior to admission: YES  Bariatric?: No    Vital Signs:   Vitals:    03/28/22 1553 03/28/22 1630 03/28/22 1645   BP: (!) 163/87 117/61    Pulse: 77 67    Resp: 18     SpO2: 99% 99% 98%   Weight: 88.5 kg (195 lb)     Height: 1.803 m (5' 11\")         Cardiac Rhythm:     Was the PSS-3 completed:   Yes  What interventions are required if any?               Family Comments:     OBS brochure/video discussed/provided to " patient/family: N/A              Name of person given brochure if not patient:                 Relationship to patient:       For the majority of the shift this patient's behavior was Green.   Behavioral interventions performed were   .    ED NURSE PHONE NUMBER: [id number]

## 2022-03-28 NOTE — ED TRIAGE NOTES
Pt was hallucinating at home, spouse called EMS.  Found to have blood sugar < 40.  Pt is unclear if he took his novolog today, admits to drinking mouthwash.

## 2022-03-28 NOTE — ED NOTES
Bed: ED24  Expected date:   Expected time:   Means of arrival:   Comments:  442  78 M hypoglycemia  Here now

## 2022-03-29 LAB
ALBUMIN SERPL-MCNC: 3.1 G/DL (ref 3.4–5)
ALP SERPL-CCNC: 106 U/L (ref 40–150)
ALT SERPL W P-5'-P-CCNC: 21 U/L (ref 0–70)
ANION GAP SERPL CALCULATED.3IONS-SCNC: 4 MMOL/L (ref 3–14)
AST SERPL W P-5'-P-CCNC: 24 U/L (ref 0–45)
BILIRUB DIRECT SERPL-MCNC: 0.2 MG/DL (ref 0–0.2)
BILIRUB SERPL-MCNC: 0.5 MG/DL (ref 0.2–1.3)
BUN SERPL-MCNC: 6 MG/DL (ref 7–30)
CALCIUM SERPL-MCNC: 10.2 MG/DL (ref 8.5–10.1)
CHLORIDE BLD-SCNC: 104 MMOL/L (ref 94–109)
CO2 SERPL-SCNC: 26 MMOL/L (ref 20–32)
CREAT SERPL-MCNC: 0.73 MG/DL (ref 0.66–1.25)
ERYTHROCYTE [DISTWIDTH] IN BLOOD BY AUTOMATED COUNT: 22.7 % (ref 10–15)
GFR SERPL CREATININE-BSD FRML MDRD: >90 ML/MIN/1.73M2
GLUCOSE BLD-MCNC: 61 MG/DL (ref 70–99)
GLUCOSE BLDC GLUCOMTR-MCNC: 100 MG/DL (ref 70–99)
GLUCOSE BLDC GLUCOMTR-MCNC: 101 MG/DL (ref 70–99)
GLUCOSE BLDC GLUCOMTR-MCNC: 102 MG/DL (ref 70–99)
GLUCOSE BLDC GLUCOMTR-MCNC: 108 MG/DL (ref 70–99)
GLUCOSE BLDC GLUCOMTR-MCNC: 110 MG/DL (ref 70–99)
GLUCOSE BLDC GLUCOMTR-MCNC: 113 MG/DL (ref 70–99)
GLUCOSE BLDC GLUCOMTR-MCNC: 115 MG/DL (ref 70–99)
GLUCOSE BLDC GLUCOMTR-MCNC: 129 MG/DL (ref 70–99)
GLUCOSE BLDC GLUCOMTR-MCNC: 129 MG/DL (ref 70–99)
GLUCOSE BLDC GLUCOMTR-MCNC: 138 MG/DL (ref 70–99)
GLUCOSE BLDC GLUCOMTR-MCNC: 140 MG/DL (ref 70–99)
GLUCOSE BLDC GLUCOMTR-MCNC: 140 MG/DL (ref 70–99)
GLUCOSE BLDC GLUCOMTR-MCNC: 45 MG/DL (ref 70–99)
GLUCOSE BLDC GLUCOMTR-MCNC: 48 MG/DL (ref 70–99)
GLUCOSE BLDC GLUCOMTR-MCNC: 56 MG/DL (ref 70–99)
GLUCOSE BLDC GLUCOMTR-MCNC: 60 MG/DL (ref 70–99)
GLUCOSE BLDC GLUCOMTR-MCNC: 60 MG/DL (ref 70–99)
GLUCOSE BLDC GLUCOMTR-MCNC: 63 MG/DL (ref 70–99)
GLUCOSE BLDC GLUCOMTR-MCNC: 64 MG/DL (ref 70–99)
GLUCOSE BLDC GLUCOMTR-MCNC: 65 MG/DL (ref 70–99)
GLUCOSE BLDC GLUCOMTR-MCNC: 65 MG/DL (ref 70–99)
GLUCOSE BLDC GLUCOMTR-MCNC: 68 MG/DL (ref 70–99)
GLUCOSE BLDC GLUCOMTR-MCNC: 74 MG/DL (ref 70–99)
GLUCOSE BLDC GLUCOMTR-MCNC: 76 MG/DL (ref 70–99)
GLUCOSE BLDC GLUCOMTR-MCNC: 82 MG/DL (ref 70–99)
GLUCOSE BLDC GLUCOMTR-MCNC: 84 MG/DL (ref 70–99)
GLUCOSE BLDC GLUCOMTR-MCNC: 87 MG/DL (ref 70–99)
GLUCOSE BLDC GLUCOMTR-MCNC: 95 MG/DL (ref 70–99)
GLUCOSE BLDC GLUCOMTR-MCNC: 95 MG/DL (ref 70–99)
GLUCOSE BLDC GLUCOMTR-MCNC: 97 MG/DL (ref 70–99)
HCT VFR BLD AUTO: 35.6 % (ref 40–53)
HGB BLD-MCNC: 9.6 G/DL (ref 13.3–17.7)
LACTATE SERPL-SCNC: 1.9 MMOL/L (ref 0.7–2)
MAGNESIUM SERPL-MCNC: 2.1 MG/DL (ref 1.6–2.3)
MCH RBC QN AUTO: 17.1 PG (ref 26.5–33)
MCHC RBC AUTO-ENTMCNC: 27 G/DL (ref 31.5–36.5)
MCV RBC AUTO: 63 FL (ref 78–100)
PHOSPHATE SERPL-MCNC: 1.4 MG/DL (ref 2.5–4.5)
PHOSPHATE SERPL-MCNC: 2.5 MG/DL (ref 2.5–4.5)
PLATELET # BLD AUTO: 446 10E3/UL (ref 150–450)
POTASSIUM BLD-SCNC: 3.8 MMOL/L (ref 3.4–5.3)
POTASSIUM BLD-SCNC: 4.7 MMOL/L (ref 3.4–5.3)
PROCALCITONIN SERPL-MCNC: <0.05 NG/ML
PROT SERPL-MCNC: 5.8 G/DL (ref 6.8–8.8)
RBC # BLD AUTO: 5.62 10E6/UL (ref 4.4–5.9)
SODIUM SERPL-SCNC: 134 MMOL/L (ref 133–144)
WBC # BLD AUTO: 14 10E3/UL (ref 4–11)

## 2022-03-29 PROCEDURE — 84100 ASSAY OF PHOSPHORUS: CPT | Performed by: HOSPITALIST

## 2022-03-29 PROCEDURE — 120N000013 HC R&B IMCU

## 2022-03-29 PROCEDURE — 84155 ASSAY OF PROTEIN SERUM: CPT | Performed by: PHYSICIAN ASSISTANT

## 2022-03-29 PROCEDURE — 258N000001 HC RX 258: Performed by: PHYSICIAN ASSISTANT

## 2022-03-29 PROCEDURE — 36415 COLL VENOUS BLD VENIPUNCTURE: CPT | Performed by: HOSPITALIST

## 2022-03-29 PROCEDURE — 250N000013 HC RX MED GY IP 250 OP 250 PS 637: Performed by: NURSE PRACTITIONER

## 2022-03-29 PROCEDURE — 250N000011 HC RX IP 250 OP 636: Performed by: PHYSICIAN ASSISTANT

## 2022-03-29 PROCEDURE — 83735 ASSAY OF MAGNESIUM: CPT | Performed by: HOSPITALIST

## 2022-03-29 PROCEDURE — 84132 ASSAY OF SERUM POTASSIUM: CPT | Performed by: INTERNAL MEDICINE

## 2022-03-29 PROCEDURE — 84100 ASSAY OF PHOSPHORUS: CPT

## 2022-03-29 PROCEDURE — 82248 BILIRUBIN DIRECT: CPT | Performed by: PHYSICIAN ASSISTANT

## 2022-03-29 PROCEDURE — 99233 SBSQ HOSP IP/OBS HIGH 50: CPT | Performed by: PHYSICIAN ASSISTANT

## 2022-03-29 PROCEDURE — 250N000013 HC RX MED GY IP 250 OP 250 PS 637: Performed by: HOSPITALIST

## 2022-03-29 PROCEDURE — 258N000001 HC RX 258: Performed by: HOSPITALIST

## 2022-03-29 PROCEDURE — 84145 PROCALCITONIN (PCT): CPT | Performed by: PHYSICIAN ASSISTANT

## 2022-03-29 PROCEDURE — 36415 COLL VENOUS BLD VENIPUNCTURE: CPT | Performed by: INTERNAL MEDICINE

## 2022-03-29 PROCEDURE — 83605 ASSAY OF LACTIC ACID: CPT | Performed by: HOSPITALIST

## 2022-03-29 PROCEDURE — 82310 ASSAY OF CALCIUM: CPT | Performed by: HOSPITALIST

## 2022-03-29 PROCEDURE — 85027 COMPLETE CBC AUTOMATED: CPT | Performed by: HOSPITALIST

## 2022-03-29 PROCEDURE — 120N000001 HC R&B MED SURG/OB

## 2022-03-29 PROCEDURE — 258N000001 HC RX 258: Performed by: EMERGENCY MEDICINE

## 2022-03-29 RX ORDER — LIDOCAINE 40 MG/G
CREAM TOPICAL
Status: DISCONTINUED | OUTPATIENT
Start: 2022-03-29 | End: 2022-04-01 | Stop reason: HOSPADM

## 2022-03-29 RX ORDER — POTASSIUM CHLORIDE 1500 MG/1
40 TABLET, EXTENDED RELEASE ORAL ONCE
Status: COMPLETED | OUTPATIENT
Start: 2022-03-29 | End: 2022-03-29

## 2022-03-29 RX ORDER — LABETALOL HYDROCHLORIDE 5 MG/ML
10 INJECTION, SOLUTION INTRAVENOUS
Status: DISCONTINUED | OUTPATIENT
Start: 2022-03-29 | End: 2022-04-01 | Stop reason: HOSPADM

## 2022-03-29 RX ORDER — POTASSIUM CHLORIDE 1500 MG/1
20 TABLET, EXTENDED RELEASE ORAL ONCE
Status: COMPLETED | OUTPATIENT
Start: 2022-03-29 | End: 2022-03-29

## 2022-03-29 RX ADMIN — TRAZODONE HYDROCHLORIDE 50 MG: 50 TABLET ORAL at 21:04

## 2022-03-29 RX ADMIN — DEXTROSE MONOHYDRATE 25 ML: 25 INJECTION, SOLUTION INTRAVENOUS at 09:22

## 2022-03-29 RX ADMIN — DEXTROSE MONOHYDRATE: 100 INJECTION, SOLUTION INTRAVENOUS at 07:04

## 2022-03-29 RX ADMIN — DEXTROSE 15 G: 15 GEL ORAL at 07:02

## 2022-03-29 RX ADMIN — DEXTROSE 15 G: 15 GEL ORAL at 12:40

## 2022-03-29 RX ADMIN — DEXTROSE 15 G: 15 GEL ORAL at 14:54

## 2022-03-29 RX ADMIN — AMLODIPINE BESYLATE 2.5 MG: 2.5 TABLET ORAL at 07:51

## 2022-03-29 RX ADMIN — DEXTROSE MONOHYDRATE: 100 INJECTION, SOLUTION INTRAVENOUS at 04:25

## 2022-03-29 RX ADMIN — DEXTROSE 15 G: 15 GEL ORAL at 12:53

## 2022-03-29 RX ADMIN — THIAMINE HCL TAB 100 MG 100 MG: 100 TAB at 07:51

## 2022-03-29 RX ADMIN — POTASSIUM & SODIUM PHOSPHATES POWDER PACK 280-160-250 MG 2 PACKET: 280-160-250 PACK at 21:04

## 2022-03-29 RX ADMIN — PROPRANOLOL HYDROCHLORIDE 60 MG: 60 CAPSULE, EXTENDED RELEASE ORAL at 07:50

## 2022-03-29 RX ADMIN — DEXTROSE MONOHYDRATE: 100 INJECTION, SOLUTION INTRAVENOUS at 17:47

## 2022-03-29 RX ADMIN — ATORVASTATIN CALCIUM 20 MG: 10 TABLET, FILM COATED ORAL at 07:51

## 2022-03-29 RX ADMIN — DEXTROSE 15 G: 15 GEL ORAL at 04:13

## 2022-03-29 RX ADMIN — POTASSIUM CHLORIDE 20 MEQ: 1500 TABLET, EXTENDED RELEASE ORAL at 06:11

## 2022-03-29 RX ADMIN — LISINOPRIL 30 MG: 10 TABLET ORAL at 07:50

## 2022-03-29 RX ADMIN — DEXTROSE 15 G: 15 GEL ORAL at 07:48

## 2022-03-29 RX ADMIN — IBUPROFEN 400 MG: 200 TABLET, FILM COATED ORAL at 18:03

## 2022-03-29 RX ADMIN — HYALURONIDASE (HUMAN RECOMBINANT) 150 UNITS: 150 INJECTION, SOLUTION SUBCUTANEOUS at 18:42

## 2022-03-29 RX ADMIN — DEXTROSE 15 G: 15 GEL ORAL at 06:10

## 2022-03-29 RX ADMIN — DEXTROSE 15 G: 15 GEL ORAL at 21:04

## 2022-03-29 RX ADMIN — PANTOPRAZOLE SODIUM 20 MG: 20 TABLET, DELAYED RELEASE ORAL at 07:51

## 2022-03-29 RX ADMIN — MULTIPLE VITAMINS W/ MINERALS TAB 1 TABLET: TAB at 07:49

## 2022-03-29 RX ADMIN — POTASSIUM CHLORIDE 40 MEQ: 1500 TABLET, EXTENDED RELEASE ORAL at 04:48

## 2022-03-29 RX ADMIN — CITALOPRAM HYDROBROMIDE 20 MG: 20 TABLET ORAL at 07:49

## 2022-03-29 RX ADMIN — DEXTROSE MONOHYDRATE: 100 INJECTION, SOLUTION INTRAVENOUS at 19:03

## 2022-03-29 RX ADMIN — DEXTROSE MONOHYDRATE: 100 INJECTION, SOLUTION INTRAVENOUS at 09:30

## 2022-03-29 RX ADMIN — DEXTROSE MONOHYDRATE 25 ML: 25 INJECTION, SOLUTION INTRAVENOUS at 10:17

## 2022-03-29 RX ADMIN — TAMSULOSIN HYDROCHLORIDE 0.4 MG: 0.4 CAPSULE ORAL at 07:49

## 2022-03-29 RX ADMIN — DEXTROSE 15 G: 15 GEL ORAL at 22:41

## 2022-03-29 RX ADMIN — POTASSIUM & SODIUM PHOSPHATES POWDER PACK 280-160-250 MG 2 PACKET: 280-160-250 PACK at 07:55

## 2022-03-29 RX ADMIN — DEXTROSE 30 G: 15 GEL ORAL at 01:53

## 2022-03-29 RX ADMIN — POTASSIUM & SODIUM PHOSPHATES POWDER PACK 280-160-250 MG 2 PACKET: 280-160-250 PACK at 16:26

## 2022-03-29 RX ADMIN — DEXTROSE MONOHYDRATE: 100 INJECTION, SOLUTION INTRAVENOUS at 16:30

## 2022-03-29 RX ADMIN — DEXTROSE 15 G: 15 GEL ORAL at 23:41

## 2022-03-29 RX ADMIN — DEXTROSE 15 G: 15 GEL ORAL at 21:49

## 2022-03-29 RX ADMIN — FOLIC ACID 1 MG: 1 TABLET ORAL at 07:51

## 2022-03-29 ASSESSMENT — ACTIVITIES OF DAILY LIVING (ADL)
ADLS_ACUITY_SCORE: 6

## 2022-03-29 NOTE — PROGRESS NOTES
Hospitalist update note    Patient continues to be hypoglyemic. [place] [facility] [place] frequent gluc checks. Continues to require hypoglycemia supplementation even on D10w gtt 125 ml/hr. Insulin washout, last dose yesterday AM of 70/30.     Plan:  - Increase D10w to 200 ml/hr  - Continue to monitor on IM, frequent gluc check per orders  - If glucose persistently >250, page Hospitalist to decrease rate of D10w gtt    Recent Labs   Lab 03/29/22  1310 03/29/22  1251 03/29/22  1237 03/29/22  1131 03/29/22  1101 03/29/22  0928   GLC 95 60* 64* 108* 140* 140*         SHIKHA Merrill, PA-C  Hospitalist TERI  [facility]  Securely message with the Vocera Web Console (learn more here)  Text page via [facility] Paging/Directory

## 2022-03-29 NOTE — ED NOTES
DATE:  3/28/2022   TIME OF RECEIPT FROM LAB:  1908  LAB TEST:  Lactate  LAB VALUE:  4.7  RESULTS GIVEN WITH READ-BACK TO (PROVIDER):  Mark Flores MD  TIME LAB VALUE REPORTED TO PROVIDER:   6449

## 2022-03-29 NOTE — PROGRESS NOTES
Hospitalist TERI cross cover:    Paged by nursing as pt requesting Advil PRN for headache; noted creatinine and platelets WNL, noted neuro exam intact.  Advil ordered PRN.    Christiano Prado, APRN, CNP  Hospitalist TERI    No charge.

## 2022-03-29 NOTE — PROVIDER NOTIFICATION
"Text paged PA Madina Schultz, \"FYI IV infiltrated, possibly with D50 IVP which is a vesicant. Just need the IV extravastion order set ordered\"    Extravasation protocol started, hyaluronidase given.   "

## 2022-03-29 NOTE — UTILIZATION REVIEW
Admission Status; Secondary Review Determination     Admission Date: 3/28/2022  3:41 PM       Under the authority of the Utilization Management Committee, the utilization review process indicated a secondary review on the above patient.  The review outcome is based on review of the medical records, discussions with staff, and applying clinical experience noted on the date of the review.        (x)      Inpatient Status Appropriate - This patient's medical care is consistent with medical management for inpatient care and reasonable inpatient medical practice.       RATIONALE FOR DETERMINATION      Brief clinical presentation, information copied from the chart, abbreviated and edited for relevant content:     Humberto Espinoza is a 71 year old male who presented with symptomatic hypoglycemia in the setting of alcohol use disorder, DM2. Labs signficant for WBC 14, Hgb 9.6, Glucoses 63 - 56- 100s - 64 - 60. Persistently low despite treatment.  Requiring D10 in ED to maintain BGs 80-90s.  BG dropped again to 47 at 7pm, D10 infusion increased to 100ml/hr. This morning, pt felt sweaty and off like gluc low, checked and 45, increased D10w, given pilar crackers, orange juice and 25mL D50w. Had bfast but only scram eggs, no CHO. Transferred to [facility] for close monitoring until glucose stabilizes given continued drop in glucose after repletion. Plan to monitor BGs q2h, once BGs stable and >90 consecutively, will switch to Q4h checks overnight, with plan to stay on IVF. Not safe for discharge.     At the time of admission with the information available to the attending physician, more than 2 nights hospital complex care was anticipated. Also, there was a risk of adverse outcome if patient was treated outpatient or observation. High intensity of services anticipated. Inpatient admission appropriate based on Medicare guidelines.       The information on this document is developed by the utilization review team in order for the  business office to ensure compliance.  This only denotes the appropriateness of proper admission status and does not reflect the quality of care rendered.         The definitions of Inpatient Status and Observation Status used in making the determination above are those provided in the CMS Coverage Manual, Chapter 1 and Chapter 6, section 70.4.      Sincerely,      Jayna Hooper MD   Utilization Review/ Case Management  [facility].

## 2022-03-29 NOTE — PLAN OF CARE
Pt. A/Ox4, moves with SBA, VSS - high BP at times, does take medication for and PRN available. Heart sounds regular, lung sounds are clear, +1/trace edema to left inner arm in the AC region due to IV infiltration. Hyalurondiase given SubQ for infiltration of dextrose. BG checks every 30 less than 100, every one hour less than 140, every two hours greater than 140. BG has been running low throughout the day Glutose 15G given x3 with Dextrose 10% running at 200ml/hr - encourage CHO intake.   Phosphorus replaced x2 during day shift and will need one more dose at 2200 with a recheck at 0600.    Does not his wife contacted by staff at this time.

## 2022-03-29 NOTE — PLAN OF CARE
RECEIVING UNIT ED HANDOFF REVIEW    ED Nurse Handoff Report was reviewed by: Johnnie Nolasco RN on March 28, 2022 at 8:21 PM

## 2022-03-29 NOTE — PROVIDER NOTIFICATION
Approximately 0900 spoke with Madina FINNEY with the hospaitalist group about the pt's fluctuating blood sugars last night and this morning.  This RN has been checking his blood sugars approximately every 30 minutes.  Pt given oral glucose gel, IV D 50 glucose per MD orders and orange juice, apple juice etc.  Encouraged pt to order carbohydrates for breakfast like pancakes or Salv[place] toast and the pt ordered eggs.  Unable to maintain BG above 100, spoke with Uche FINNEY again and the decision was made to transfer the pt to [facility] for closer monitoring.  New orders received and noted.  Ada RN at the bedside to help with cares and the transfer of the pt to [facility].  Report called to C RN.

## 2022-03-29 NOTE — PLAN OF CARE
Patient alert & oriented x4. VSS on RA except for BP's. SBP's are running between 140's-160's. Patient will receive BP medications this morning. Patient has dextrose 10% running at 100 mlhr. Patient continues to have low blood sugar levels which are replaced to stable levels. Patient given PO glucose per MAR. Juice & pilar crackers provided. Patient up SBA to the restroom. Bed alarm on for safety. Potassium & phosphorus replacement protocol started due to low levels noted in results. Will continue to monitor.

## 2022-03-30 LAB
ALBUMIN SERPL-MCNC: 2.9 G/DL (ref 3.4–5)
ALP SERPL-CCNC: 94 U/L (ref 40–150)
ALT SERPL W P-5'-P-CCNC: 18 U/L (ref 0–70)
ANION GAP SERPL CALCULATED.3IONS-SCNC: 3 MMOL/L (ref 3–14)
AST SERPL W P-5'-P-CCNC: 14 U/L (ref 0–45)
BILIRUB SERPL-MCNC: 0.3 MG/DL (ref 0.2–1.3)
BUN SERPL-MCNC: 5 MG/DL (ref 7–30)
CALCIUM SERPL-MCNC: 9.3 MG/DL (ref 8.5–10.1)
CHLORIDE BLD-SCNC: 103 MMOL/L (ref 94–109)
CO2 SERPL-SCNC: 28 MMOL/L (ref 20–32)
CREAT SERPL-MCNC: 0.71 MG/DL (ref 0.66–1.25)
ERYTHROCYTE [DISTWIDTH] IN BLOOD BY AUTOMATED COUNT: 22.7 % (ref 10–15)
GFR SERPL CREATININE-BSD FRML MDRD: >90 ML/MIN/1.73M2
GLUCOSE BLD-MCNC: 106 MG/DL (ref 70–99)
GLUCOSE BLDC GLUCOMTR-MCNC: 108 MG/DL (ref 70–99)
GLUCOSE BLDC GLUCOMTR-MCNC: 117 MG/DL (ref 70–99)
GLUCOSE BLDC GLUCOMTR-MCNC: 127 MG/DL (ref 70–99)
GLUCOSE BLDC GLUCOMTR-MCNC: 129 MG/DL (ref 70–99)
GLUCOSE BLDC GLUCOMTR-MCNC: 173 MG/DL (ref 70–99)
GLUCOSE BLDC GLUCOMTR-MCNC: 219 MG/DL (ref 70–99)
GLUCOSE BLDC GLUCOMTR-MCNC: 234 MG/DL (ref 70–99)
GLUCOSE BLDC GLUCOMTR-MCNC: 244 MG/DL (ref 70–99)
GLUCOSE BLDC GLUCOMTR-MCNC: 263 MG/DL (ref 70–99)
GLUCOSE BLDC GLUCOMTR-MCNC: 61 MG/DL (ref 70–99)
GLUCOSE BLDC GLUCOMTR-MCNC: 68 MG/DL (ref 70–99)
GLUCOSE BLDC GLUCOMTR-MCNC: 79 MG/DL (ref 70–99)
GLUCOSE BLDC GLUCOMTR-MCNC: 81 MG/DL (ref 70–99)
GLUCOSE BLDC GLUCOMTR-MCNC: 81 MG/DL (ref 70–99)
GLUCOSE BLDC GLUCOMTR-MCNC: 85 MG/DL (ref 70–99)
GLUCOSE BLDC GLUCOMTR-MCNC: 96 MG/DL (ref 70–99)
HCT VFR BLD AUTO: 31.3 % (ref 40–53)
HGB BLD-MCNC: 8.6 G/DL (ref 13.3–17.7)
MAGNESIUM SERPL-MCNC: 2 MG/DL (ref 1.6–2.3)
MCH RBC QN AUTO: 17.3 PG (ref 26.5–33)
MCHC RBC AUTO-ENTMCNC: 27.5 G/DL (ref 31.5–36.5)
MCV RBC AUTO: 63 FL (ref 78–100)
PHOSPHATE SERPL-MCNC: 3.6 MG/DL (ref 2.5–4.5)
PLATELET # BLD AUTO: 377 10E3/UL (ref 150–450)
POTASSIUM BLD-SCNC: 4.1 MMOL/L (ref 3.4–5.3)
PROT SERPL-MCNC: 5 G/DL (ref 6.8–8.8)
RBC # BLD AUTO: 4.97 10E6/UL (ref 4.4–5.9)
SODIUM SERPL-SCNC: 134 MMOL/L (ref 133–144)
WBC # BLD AUTO: 12.6 10E3/UL (ref 4–11)

## 2022-03-30 PROCEDURE — 258N000001 HC RX 258: Performed by: HOSPITALIST

## 2022-03-30 PROCEDURE — 250N000011 HC RX IP 250 OP 636: Performed by: HOSPITALIST

## 2022-03-30 PROCEDURE — 84100 ASSAY OF PHOSPHORUS: CPT | Performed by: PHYSICIAN ASSISTANT

## 2022-03-30 PROCEDURE — 250N000013 HC RX MED GY IP 250 OP 250 PS 637: Performed by: NURSE PRACTITIONER

## 2022-03-30 PROCEDURE — 258N000001 HC RX 258: Performed by: PHYSICIAN ASSISTANT

## 2022-03-30 PROCEDURE — 120N000013 HC R&B IMCU

## 2022-03-30 PROCEDURE — 250N000013 HC RX MED GY IP 250 OP 250 PS 637: Performed by: HOSPITALIST

## 2022-03-30 PROCEDURE — 99233 SBSQ HOSP IP/OBS HIGH 50: CPT | Performed by: PHYSICIAN ASSISTANT

## 2022-03-30 PROCEDURE — 80053 COMPREHEN METABOLIC PANEL: CPT | Performed by: PHYSICIAN ASSISTANT

## 2022-03-30 PROCEDURE — 250N000009 HC RX 250

## 2022-03-30 PROCEDURE — 85027 COMPLETE CBC AUTOMATED: CPT | Performed by: PHYSICIAN ASSISTANT

## 2022-03-30 PROCEDURE — 83735 ASSAY OF MAGNESIUM: CPT | Performed by: PHYSICIAN ASSISTANT

## 2022-03-30 PROCEDURE — 36415 COLL VENOUS BLD VENIPUNCTURE: CPT | Performed by: PHYSICIAN ASSISTANT

## 2022-03-30 PROCEDURE — 120N000001 HC R&B MED SURG/OB

## 2022-03-30 RX ORDER — DEXTROSE MONOHYDRATE 100 MG/ML
INJECTION, SOLUTION INTRAVENOUS CONTINUOUS
Status: DISCONTINUED | OUTPATIENT
Start: 2022-03-30 | End: 2022-03-31

## 2022-03-30 RX ORDER — DEXTROSE 20 G/100ML
INJECTION, SOLUTION INTRAVENOUS CONTINUOUS
Status: DISCONTINUED | OUTPATIENT
Start: 2022-03-30 | End: 2022-03-30

## 2022-03-30 RX ORDER — WATER 10 ML/10ML
INJECTION INTRAMUSCULAR; INTRAVENOUS; SUBCUTANEOUS
Status: DISPENSED
Start: 2022-03-30 | End: 2022-03-30

## 2022-03-30 RX ADMIN — PROPRANOLOL HYDROCHLORIDE 60 MG: 60 CAPSULE, EXTENDED RELEASE ORAL at 08:45

## 2022-03-30 RX ADMIN — ATORVASTATIN CALCIUM 20 MG: 10 TABLET, FILM COATED ORAL at 08:45

## 2022-03-30 RX ADMIN — WATER 10 ML: 1 INJECTION INTRAMUSCULAR; INTRAVENOUS; SUBCUTANEOUS at 03:00

## 2022-03-30 RX ADMIN — TAMSULOSIN HYDROCHLORIDE 0.4 MG: 0.4 CAPSULE ORAL at 08:45

## 2022-03-30 RX ADMIN — DEXTROSE MONOHYDRATE: 100 INJECTION, SOLUTION INTRAVENOUS at 00:11

## 2022-03-30 RX ADMIN — DEXTROSE 15 G: 15 GEL ORAL at 08:29

## 2022-03-30 RX ADMIN — THIAMINE HCL TAB 100 MG 100 MG: 100 TAB at 08:45

## 2022-03-30 RX ADMIN — DEXTROSE MONOHYDRATE 25 ML: 25 INJECTION, SOLUTION INTRAVENOUS at 02:15

## 2022-03-30 RX ADMIN — DEXTROSE MONOHYDRATE 25 ML: 25 INJECTION, SOLUTION INTRAVENOUS at 06:43

## 2022-03-30 RX ADMIN — AMLODIPINE BESYLATE 2.5 MG: 2.5 TABLET ORAL at 08:45

## 2022-03-30 RX ADMIN — DEXTROSE MONOHYDRATE: 100 INJECTION, SOLUTION INTRAVENOUS at 12:06

## 2022-03-30 RX ADMIN — PANTOPRAZOLE SODIUM 20 MG: 20 TABLET, DELAYED RELEASE ORAL at 08:45

## 2022-03-30 RX ADMIN — DEXTROSE MONOHYDRATE: 100 INJECTION, SOLUTION INTRAVENOUS at 17:14

## 2022-03-30 RX ADMIN — DEXTROSE MONOHYDRATE 25 ML: 25 INJECTION, SOLUTION INTRAVENOUS at 05:56

## 2022-03-30 RX ADMIN — LISINOPRIL 30 MG: 10 TABLET ORAL at 08:45

## 2022-03-30 RX ADMIN — FOLIC ACID 1 MG: 1 TABLET ORAL at 08:45

## 2022-03-30 RX ADMIN — IBUPROFEN 400 MG: 200 TABLET, FILM COATED ORAL at 03:44

## 2022-03-30 RX ADMIN — DEXTROSE MONOHYDRATE: 100 INJECTION, SOLUTION INTRAVENOUS at 05:50

## 2022-03-30 RX ADMIN — TRAZODONE HYDROCHLORIDE 50 MG: 50 TABLET ORAL at 21:17

## 2022-03-30 RX ADMIN — IBUPROFEN 400 MG: 200 TABLET, FILM COATED ORAL at 17:17

## 2022-03-30 RX ADMIN — MULTIPLE VITAMINS W/ MINERALS TAB 1 TABLET: TAB at 08:45

## 2022-03-30 RX ADMIN — DEXTROSE MONOHYDRATE 25 ML: 25 INJECTION, SOLUTION INTRAVENOUS at 01:00

## 2022-03-30 RX ADMIN — DEXTROSE MONOHYDRATE 25 ML: 25 INJECTION, SOLUTION INTRAVENOUS at 01:38

## 2022-03-30 RX ADMIN — DEXTROSE 15 G: 15 GEL ORAL at 00:11

## 2022-03-30 RX ADMIN — CITALOPRAM HYDROBROMIDE 20 MG: 20 TABLET ORAL at 08:45

## 2022-03-30 RX ADMIN — GLUCAGON HYDROCHLORIDE 1 MG: KIT at 03:00

## 2022-03-30 ASSESSMENT — ACTIVITIES OF DAILY LIVING (ADL)
ADLS_ACUITY_SCORE: 6
ADLS_ACUITY_SCORE: 8
ADLS_ACUITY_SCORE: 6
ADLS_ACUITY_SCORE: 8
ADLS_ACUITY_SCORE: 6
ADLS_ACUITY_SCORE: 8
ADLS_ACUITY_SCORE: 6

## 2022-03-30 NOTE — PROGRESS NOTES
A+Ox4 VSS on room air. Lung sounds clear. Minimal CIWA score. BG covered with gel. Denies SOB, pain, nausea. Up SBA. Tolerating diet. CMS/neuros intact

## 2022-03-30 NOTE — PROVIDER NOTIFICATION
MD Notification    Notified Person: MD    Notified Person Name: Santiago Peguero    Notification Date/Time: 3/30/22 5590    Notification Interaction: text page    Purpose of Notification:Glucagon administered, BG decreased from 96 to 85. After admin pt had PSVT run of 44 beats, then was davida in 50s about 1 min, now back in 60s and regular.     Orders Received:    Comments:

## 2022-03-30 NOTE — PROGRESS NOTES
[id number]: Alert and oriented x4. Vital signs stable on RA. SBA. Tolerating regular diet. Lung sounds clear. BM -. Adequate urine output. Pain managed with ibuprofen. Denies nausea. Tele SR BBB. 44 beat PSVT after glucagon injection. Oral glucose administered x2, D50 25mL x3, glucagon administered per MD orders. Glucose above 100 for 2 hours before needing an additional 2 doses of D50 25 mL. Minimal CIWA scores.

## 2022-03-30 NOTE — PROGRESS NOTES
[facility]    Medicine Progress Note - Hospitalist Service       Date of Admission:  3/28/2022  Assessment & Plan   Humberto Espinoza is a 71 year old male who presents with symptomatic hypoglycemia     Symptomatic Hypoglycemia  Diabetes mellitus type 2  Lactic acidosis secondary to likely alcohol related acidosis, resolved  PTA novolin 70/30 20 units BID and metformin. He has been decreasing his dose of novolin lately due to lower BGs. States his goal is BG <180. Requiring D10 in ED to maintain BGs 80-90s. Last novolin was 3/28 AM 30 units. Previously on Lantus. Buys his insulin OTC at [facility]. Denies regimen changes recently. Unclear when he switched from Lantus to Novolog. +CGM, Freestyle lizbet.  Recheck lactic acid was 4.7--could be secondary to metformin vs hypoglycemia-- normalized on recheck. He does not look acutely ill or septic. No abx planned. UA WNL.   * AM 3/29 during Hospitalist visit, pt felt sweaty and off like gluc low, checked and 45, increased D10w, given pilar crackers, orange juice and 25mL D50w. Had bfast but only scram eggs, no CHO.  * 3/30, continues on D10w 200ml/hr with recurrent hypoglyemia requiring outside treatment  - Transfered to [facility] 3/29 for close monitoring until glucose stabilizes given continued drop in glucose after repletion. Continue IMC until weaned off D10w  - Continue with frequent glucose checks with goal glucose >140  - Once BGs stable and >90 consecutively, will switch to Q4h checks overnight, then resume ACHS  - regular diet, encourage intake of CHO  - D10w gtt uptitrated to 200 ml/hr 3/29 due to persistent hypoglycemia -- considered changing to D20w 150 ml/hr however patient would need PICC for this, would like to avoid  - No sliding scale insulin at this time while monitoring BGs  - Hold novolin and metformin  - ? Poor insight into situation vs due to hypoglycemia, declines a call to wife, reports titrating insulin 20-30 units pending glucose  levels, high stress recently, previously on Lantus but switched to Novolog 70/30. He is AAOx3, able to provide history.      Recent Labs   Lab 03/30/22  0719 03/30/22  0637 03/30/22  0607 03/30/22  0555 03/30/22  0444 03/30/22  0334   * 79 106* 61* 127* 129*       LUE extravasation of D50w  Reportedly extravasation of D50w on 3/29. Orderset placed and given dose of hyaluronidase per protocol. Minimal tenderness to area of Lt forearm. No warmth, erythema, or signs of tissue necrosis.  - Monitor    Leukocytosis, improving  May be reactive. Patient denies fevers or chills. Endorses sweatiness when he feels hypoglycemic. No N/V. Denies CP, abd pain, urinary symptoms. PCT checked and negative.  - Recheck trending down off antibiotics     Hallucinations, resolved  Likely secondary to hypoglycemia, resolved in ED     Hypokalemia, resolved  Replete per protocol     Alcohol use disorder  Alcohol intoxication  Currently drinking 16oz mouthwash daily for the last 14 days. Alcohol level 0.1 on admit  - CIWA available, scoring 0-4  - daily MVI/folate/thiamine  - he reports strong support system with AA that he will follow up with     Hypertension  Hyperlipidemia  - Continue PTA amlodipine, propranolol, lisinopril with hold parameters  - Continue statin     GERD  Peptic ulcer disease  Hx partial gastrectomy  Continue PTA omeprazole     Other pertinent medical history and chronic stable diagnoses:   BPH: UA WNL. Continue flomax  Depression: Continue PTA celexa, trazodone at bedtime             COVID-19 PCR Results    COVID-19 PCR Results 1/25/21 3/28/22   COVID-19 Virus by PCR (External Result) Not Detected     SARS CoV2 PCR   Negative          Diet: Combination Diet Regular Diet Adult    DVT Prophylaxis: Pneumatic Compression Devices and Ambulate every shift  Kaplan Catheter: Not present  Code Status: Full Code      Disposition Plan   Expected Discharge: 03/31/2022     Anticipated discharge location:  Awaiting care  coordination huddle  Delays: clinical improvement    Entered: Madina Schultz PA-C 03/30/2022, 10:10 AM       The patient's care was discussed with the Attending Physician, Dr. Lewis, Bedside Nurse and Patient.    SHIKHA Merrill PA-C  Hospitalist TERI  [facility]  Securely message with the Voc[employer] Web Console (learn more here)  Text page via [employer] Paging/Directory  ______________________________________________________________________    Interval History   Seen and examined. Continues with recurrent hypoglycemia. Insulin washout. No N/V, abd pain, diarrhea/consipation. AAOx3. Discussed stopping use of mouth wash drinking. Again asked about calling his wife, patient declines, discussed this for several minutes, offered to not give info but only ask about history and patient continues to decline.     Data reviewed today: I reviewed all medications, new labs and imaging results over the last 24 hours. I personally reviewed no images or EKG's today.    Physical Exam   Vital Signs: Temp: 97.5  F (36.4  C) Temp src: Oral BP: 136/74 Pulse: 73   Resp: 15 SpO2: 99 % O2 Device: None (Room air)    Weight: 195 lbs 0 oz    General: Awake, alert, very pleasant man who appears stated age. Looks comfortable sitting up in bed. No acute distress.  HEENT: Normocephalic, atraumatic. Extraocular movements intact.   Respiratory: Clear to auscultation bilaterally, no rales, wheezing, or rhonchi.  Cardiovascular: Regular rate and rhythm, +S1 and S2, no murmur auscultated. No peripheral edema.   Gastrointestinal: Soft, non-tender, obese but non-distended. Bowel sounds present.  Skin: Warm, dry. No obvious rashes or lesions on exposed skin. Dorsalis pedis pulses palpable bilaterally.  Musculoskeletal: No joint swelling, erythema or tenderness.  Neurologic: AAO x3.  Moves all extremities equally.  Psychiatric: Appropriate mood and affect. No obvious anxiety or depression.    Data   Recent Labs   Lab  03/30/22  0719 03/30/22  0637 03/30/22  0607 03/29/22  1131 03/29/22  1104 03/29/22  0654 03/29/22  0633 03/28/22  1623 03/28/22  1551   WBC  --   --  12.6*  --   --   --  14.0*  --  11.8*   HGB  --   --  8.6*  --   --   --  9.6*  --  9.8*   MCV  --   --  63*  --   --   --  63*  --  64*   PLT  --   --  377  --   --   --  446  --  367   INR  --   --   --   --   --   --   --   --  1.01   NA  --   --  134  --   --   --  134  --  134   POTASSIUM  --   --  4.1  --  4.7  --  3.8  --  3.0*   CHLORIDE  --   --  103  --   --   --  104  --  105   CO2  --   --  28  --   --   --  26  --  21   BUN  --   --  5*  --   --   --  6*  --  10   CR  --   --  0.71  --   --   --  0.73  --  0.89   ANIONGAP  --   --  3  --   --   --  4  --  8   REBECA  --   --  9.3  --   --   --  10.2*  --  9.5   * 79 106*   < >  --    < > 61*   < > 42*   ALBUMIN  --   --  2.9*  --   --   --  3.1*  --  3.4   PROTTOTAL  --   --  5.0*  --   --   --  5.8*  --  6.5*   BILITOTAL  --   --  0.3  --   --   --  0.5  --  0.5   ALKPHOS  --   --  94  --   --   --  106  --  124   ALT  --   --  18  --   --   --  21  --  24   AST  --   --  14  --   --   --  24  --  24   LIPASE  --   --   --   --   --   --   --   --  47*    < > = values in this interval not displayed.     No results found for this or any previous visit (from the past 24 hour(s)).  Medications     dextrose 10%         amLODIPine  2.5 mg Oral Daily     atorvastatin  20 mg Oral Daily     citalopram  20 mg Oral Daily     folic acid  1 mg Oral Daily     lisinopril  30 mg Oral Daily     multivitamin w/minerals  1 tablet Oral Daily     pantoprazole  20 mg Oral Daily     propranolol ER  60 mg Oral Daily     sterile water (preservative free)         tamsulosin  0.4 mg Oral Daily     thiamine  100 mg Oral Daily     traZODone  50 mg Oral At Bedtime

## 2022-03-30 NOTE — PLAN OF CARE
Provided care [id number]:  Normalizing Glycemic levels with Dextrose 10% at 150 ml/hr.  Tolerating regular diet well. Voiding well. A & O x 4.  Room Air. SR with BBB. Stable BP.    C/o neck and back tightness/muscular pain, Ibuprofen and ice pack given.     Goal Outcome Evaluation:    Plan of Care Reviewed With: patient     Overall Patient Progress: improving

## 2022-03-30 NOTE — PLAN OF CARE
Pt had a good day.  Blood sugars have not been dropping like previously.  Last 4 have actually been high.  Dropped D10 from 200 to 150 will 3rd blood sugar >200.  Now on Q2H blood sugars as long as we don't drop again too low.  Up to bathroom and got washed up independently.  CIWA negative.  Stands at edge of bed to void in urinal.  Eating and drinking adequately.  Denies pain.

## 2022-03-30 NOTE — PROVIDER NOTIFICATION
MD Notification    Notified Person: MD    Notified Person Name: Santiago Mcqueen    Notification Date/Time: 3/30/22 0230    Notification Interaction: text page    Purpose of Notification:Pt receiving multiple oral glucose and D50 doses within last few hours. Unable to reach blood sugar of 100. D10 running at 200. Would you like to make changes to IV fluids?    Orders Received:    Comments: administer glucagon if BG is not above 100 at next recheck. If BG above 100 at next recheck and starts to drop again into the 80s give glucagon.

## 2022-03-30 NOTE — PROVIDER NOTIFICATION
Hospitalist update note    Paged about recent glucose levels greater than 200s.  Continues on D10W 200 mL/h.  Will decrease rate to 150 mL/h.  If glucose remains over 150 on 3 consecutive checks post right reduction will further decrease to 100 mL/h and then transition off while continuing to monitor closely.    Recent Labs   Lab 03/30/22  1618 03/30/22  1429 03/30/22  1155 03/30/22  1049 03/30/22  0924 03/30/22  0719   * 244* 234* 263* 117* 108*       SHIKHA Merrill, PAHermiloC  Hospitalist TERI  [facility]  Securely message with the [employer] Web Console (learn more here)  Text page via [employer] Paging/Directory

## 2022-03-31 LAB
ANION GAP SERPL CALCULATED.3IONS-SCNC: 4 MMOL/L (ref 3–14)
BUN SERPL-MCNC: 5 MG/DL (ref 7–30)
CALCIUM SERPL-MCNC: 9.5 MG/DL (ref 8.5–10.1)
CHLORIDE BLD-SCNC: 103 MMOL/L (ref 94–109)
CO2 SERPL-SCNC: 28 MMOL/L (ref 20–32)
CREAT SERPL-MCNC: 0.68 MG/DL (ref 0.66–1.25)
ERYTHROCYTE [DISTWIDTH] IN BLOOD BY AUTOMATED COUNT: 22.8 % (ref 10–15)
GFR SERPL CREATININE-BSD FRML MDRD: >90 ML/MIN/1.73M2
GLUCOSE BLD-MCNC: 95 MG/DL (ref 70–99)
GLUCOSE BLDC GLUCOMTR-MCNC: 115 MG/DL (ref 70–99)
GLUCOSE BLDC GLUCOMTR-MCNC: 125 MG/DL (ref 70–99)
GLUCOSE BLDC GLUCOMTR-MCNC: 189 MG/DL (ref 70–99)
GLUCOSE BLDC GLUCOMTR-MCNC: 214 MG/DL (ref 70–99)
GLUCOSE BLDC GLUCOMTR-MCNC: 218 MG/DL (ref 70–99)
GLUCOSE BLDC GLUCOMTR-MCNC: 227 MG/DL (ref 70–99)
GLUCOSE BLDC GLUCOMTR-MCNC: 230 MG/DL (ref 70–99)
GLUCOSE BLDC GLUCOMTR-MCNC: 257 MG/DL (ref 70–99)
GLUCOSE BLDC GLUCOMTR-MCNC: 291 MG/DL (ref 70–99)
GLUCOSE BLDC GLUCOMTR-MCNC: 97 MG/DL (ref 70–99)
HCT VFR BLD AUTO: 34.2 % (ref 40–53)
HGB BLD-MCNC: 9.1 G/DL (ref 13.3–17.7)
MAGNESIUM SERPL-MCNC: 2.3 MG/DL (ref 1.6–2.3)
MCH RBC QN AUTO: 17.3 PG (ref 26.5–33)
MCHC RBC AUTO-ENTMCNC: 26.6 G/DL (ref 31.5–36.5)
MCV RBC AUTO: 65 FL (ref 78–100)
PHOSPHATE SERPL-MCNC: 3.4 MG/DL (ref 2.5–4.5)
PLATELET # BLD AUTO: 393 10E3/UL (ref 150–450)
POTASSIUM BLD-SCNC: 4.2 MMOL/L (ref 3.4–5.3)
RBC # BLD AUTO: 5.27 10E6/UL (ref 4.4–5.9)
SODIUM SERPL-SCNC: 135 MMOL/L (ref 133–144)
WBC # BLD AUTO: 6.4 10E3/UL (ref 4–11)

## 2022-03-31 PROCEDURE — 250N000013 HC RX MED GY IP 250 OP 250 PS 637: Performed by: HOSPITALIST

## 2022-03-31 PROCEDURE — 258N000001 HC RX 258: Performed by: PHYSICIAN ASSISTANT

## 2022-03-31 PROCEDURE — 85027 COMPLETE CBC AUTOMATED: CPT | Performed by: PHYSICIAN ASSISTANT

## 2022-03-31 PROCEDURE — 250N000013 HC RX MED GY IP 250 OP 250 PS 637: Performed by: NURSE PRACTITIONER

## 2022-03-31 PROCEDURE — 84100 ASSAY OF PHOSPHORUS: CPT | Performed by: INTERNAL MEDICINE

## 2022-03-31 PROCEDURE — 250N000012 HC RX MED GY IP 250 OP 636 PS 637: Performed by: PHYSICIAN ASSISTANT

## 2022-03-31 PROCEDURE — 82310 ASSAY OF CALCIUM: CPT | Performed by: PHYSICIAN ASSISTANT

## 2022-03-31 PROCEDURE — 36415 COLL VENOUS BLD VENIPUNCTURE: CPT | Performed by: PHYSICIAN ASSISTANT

## 2022-03-31 PROCEDURE — 99233 SBSQ HOSP IP/OBS HIGH 50: CPT | Performed by: PHYSICIAN ASSISTANT

## 2022-03-31 PROCEDURE — 120N000001 HC R&B MED SURG/OB

## 2022-03-31 PROCEDURE — 83735 ASSAY OF MAGNESIUM: CPT | Performed by: INTERNAL MEDICINE

## 2022-03-31 RX ADMIN — MELATONIN 5 MG TABLET 5 MG: at 01:05

## 2022-03-31 RX ADMIN — INSULIN ASPART 1 UNITS: 100 INJECTION, SOLUTION INTRAVENOUS; SUBCUTANEOUS at 23:37

## 2022-03-31 RX ADMIN — CITALOPRAM HYDROBROMIDE 20 MG: 20 TABLET ORAL at 09:03

## 2022-03-31 RX ADMIN — TRAZODONE HYDROCHLORIDE 50 MG: 50 TABLET ORAL at 21:32

## 2022-03-31 RX ADMIN — FOLIC ACID 1 MG: 1 TABLET ORAL at 09:02

## 2022-03-31 RX ADMIN — THIAMINE HCL TAB 100 MG 100 MG: 100 TAB at 09:02

## 2022-03-31 RX ADMIN — AMLODIPINE BESYLATE 2.5 MG: 2.5 TABLET ORAL at 09:03

## 2022-03-31 RX ADMIN — TAMSULOSIN HYDROCHLORIDE 0.4 MG: 0.4 CAPSULE ORAL at 09:03

## 2022-03-31 RX ADMIN — INSULIN ASPART 1 UNITS: 100 INJECTION, SOLUTION INTRAVENOUS; SUBCUTANEOUS at 20:17

## 2022-03-31 RX ADMIN — DEXTROSE MONOHYDRATE: 100 INJECTION, SOLUTION INTRAVENOUS at 13:44

## 2022-03-31 RX ADMIN — LISINOPRIL 30 MG: 10 TABLET ORAL at 09:01

## 2022-03-31 RX ADMIN — IBUPROFEN 400 MG: 200 TABLET, FILM COATED ORAL at 20:21

## 2022-03-31 RX ADMIN — ATORVASTATIN CALCIUM 20 MG: 10 TABLET, FILM COATED ORAL at 09:02

## 2022-03-31 RX ADMIN — PROPRANOLOL HYDROCHLORIDE 60 MG: 60 CAPSULE, EXTENDED RELEASE ORAL at 09:02

## 2022-03-31 RX ADMIN — PANTOPRAZOLE SODIUM 20 MG: 20 TABLET, DELAYED RELEASE ORAL at 09:03

## 2022-03-31 RX ADMIN — DEXTROSE MONOHYDRATE: 100 INJECTION, SOLUTION INTRAVENOUS at 01:59

## 2022-03-31 RX ADMIN — MULTIPLE VITAMINS W/ MINERALS TAB 1 TABLET: TAB at 09:01

## 2022-03-31 ASSESSMENT — ACTIVITIES OF DAILY LIVING (ADL)
ADLS_ACUITY_SCORE: 8

## 2022-03-31 NOTE — PLAN OF CARE
A+Ox4 VSS on room air. Tele SR w/ BBB. Lung sounds clear. CMS/neuros intact. BG checks stable on D10 fluids. Voiding adequately. Bowels active, flatus+. Up SBA. Tolerating diet.

## 2022-03-31 NOTE — PROGRESS NOTES
[facility]    Medicine Progress Note - Hospitalist Service       Date of Admission:  3/28/2022  Assessment & Plan   Humberto Espinoza is a 71 year old male who presents with symptomatic hypoglycemia.     Symptomatic Hypoglycemia  Diabetes mellitus type 2  Lactic acidosis secondary to likely alcohol related acidosis, resolved  PTA novolin 70/30 20 units BID and metformin. He has been decreasing his dose of novolin lately due to lower BGs. States his goal is BG <180. Requiring D10 in ED to maintain BGs 80-90s. Last novolin was 3/28 AM 30 units. Previously on Lantus. Buys his insulin OTC at [facility]. Denies regimen changes recently. Unclear when he switched from Lantus to Novolog. +CGM, Freestyle lizbet.  Recheck lactic acid was 4.7--could be secondary to metformin vs hypoglycemia-- normalized on recheck. He does not look acutely ill or septic. No abx planned. UA WNL.   * Suspect hypoglycemia driven by exogenous insulin dosing in the setting of use of listerine for EtOH ingestion, stress at home, and likely decreased glycogen/gluconeogenesis effects in liver with history of EtOH use/abuse.  - Transfered to [facility] 3/29 for close monitoring until glucose stabilizes given continued drop in glucose after repletion. Continue IMC until weaned off D10w  - Continue with frequent glucose checks with goal glucose >140  - Regular diet, encourage intake of CHO  - D10w uptitrated as high as 200ml/hr, titrated down to 100 ml/hr at 8pm 3/30, will 1/2 rate to 50 ml/hr 3/31 AM given no further hypoglycemic episodes since 3/30 AM  - If remains stable without further episodes of hypoglycemia, can discontinue dextrose drip and transition to Low dose sliding scale insulin, consider very small dose Lantus tonight or tomorrow morning  - No sliding scale insulin at this time while monitoring BGs  - Hold novolin and metformin  - ? Poor insight into situation vs due to hypoglycemia, declines a call to wife, reports titrating  insulin 20-30 units pending glucose levels, high stress recently, previously on Lantus but switched to Novolog 70/30. He is AAOx3, able to provide history.     ADDENDUM 1445  Gluc stable above 200, 2 hours after meal.   - Ok to discontinue dextrose gtt and continue to monitor closely.  - Start low dose sliding scale insulin Q 4 hours  - Ok to remove IMC status  - Will consider starting low dose Lantus in AM       Recent Labs   Lab 03/31/22  1436 03/31/22  1304 03/31/22  1101 03/31/22  0908 03/31/22  0804 03/31/22  0632   * 257* 218* 291* 125* 115*        LUE extravasation of D50w  Reportedly extravasation of D50w on 3/29. Orderset placed and given dose of hyaluronidase per protocol. Minimal tenderness to area of Lt forearm. No warmth, erythema, or signs of tissue necrosis.  - Monitor, improving     Alcohol use disorder  Alcohol intoxication  Currently drinking 16oz mouthwash daily for the last 14 days. Alcohol level 0.1 on admit  - CIWA available, scoring 0-4  - daily MVI/folate/thiamine  - he reports strong support system with AA that he will follow up with     Hypertension: Continue PTA amlodipine, propranolol, lisinopril with hold parameters    Resolved Hospital Problems  Hallucinations, resolved: Likely secondary to hypoglycemia, resolved in ED  Hypokalemia, resolved: Replete per protocol  Leukocytosis, resolved: May be reactive. Patient denies fevers or chills. Endorses sweatiness when he feels hypoglycemic. No N/V. Denies CP, abd pain, urinary symptoms. PCT checked and negative. Resolved off antibiotics     Other pertinent medical history and chronic stable diagnoses:   BPH: UA WNL. Continue flomax  Depression: Continue PTA celexa, trazodone at bedtime  GERD, Peptic ulcer disease, Hx partial gastrectomy: Continue PTA omeprazole  Hyperlipidemia: Continue PTA statin       COVID-19 PCR Results    COVID-19 PCR Results 1/25/21 3/28/22   COVID-19 Virus by PCR (External Result) Not Detected     SARS CoV2 PCR    Negative         Diet: Combination Diet Regular Diet Adult    DVT Prophylaxis: Ambulate every shift  Kaplan Catheter: Not present  Code Status: Full Code      Disposition Plan   Expected Discharge: 04/01/2022     Anticipated discharge location: home    Delays: clinical improvement    Entered: Madina Schultz PA-C 03/31/2022, 3:28 PM       The patient's care was discussed with the Attending Physician, Dr. Lewis, Bedside Nurse and Patient.    SHIKHA Merrill PA-C  Hospitalist TERI  [facility]  Securely message with the Voc[employer] Web Console (learn more here)  Text page via [facility] Paging/Directory  ______________________________________________________________________    Interval History   Seen and examined.     Data reviewed today: I reviewed all medications, new labs and imaging results over the last 24 hours. I personally reviewed no images or EKG's today.    Physical Exam   Vital Signs: Temp: 98.2  F (36.8  C) Temp src: Oral BP: (!) 133/95 Pulse: 67   Resp: 15 SpO2: 97 % O2 Device: None (Room air)    Weight: 195 lbs 0 oz    General: Awake, alert, very pleasant man who appears stated age. Looks comfortable sitting up in bed. No acute distress.  HEENT: Normocephalic, atraumatic. Extraocular movements intact.   Respiratory: Clear to auscultation bilaterally, no rales, wheezing, or rhonchi.  Cardiovascular: Regular rate and rhythm, +S1 and S2, no murmur auscultated. No peripheral edema.   Gastrointestinal: Soft, non-tender, obese but non-distended. Bowel sounds present.  Skin: Warm, dry. No obvious rashes or lesions on exposed skin. Dorsalis pedis pulses palpable bilaterally.  Musculoskeletal: No joint swelling, erythema or tenderness.  Neurologic: AAO x3.  Moves all extremities equally.  Psychiatric: Appropriate mood and affect. No obvious anxiety or depression.    Data   Recent Labs   Lab 03/31/22  1436 03/31/22  1304 03/31/22  1101 03/31/22  0549 03/31/22  0532 03/30/22  0637  03/30/22  0607 03/29/22  1131 03/29/22  1104 03/29/22  0654 03/29/22  0633 03/28/22  1623 03/28/22  1551   WBC  --   --   --   --  6.4  --  12.6*  --   --   --  14.0*  --  11.8*   HGB  --   --   --   --  9.1*  --  8.6*  --   --   --  9.6*  --  9.8*   MCV  --   --   --   --  65*  --  63*  --   --   --  63*  --  64*   PLT  --   --   --   --  393  --  377  --   --   --  446  --  367   INR  --   --   --   --   --   --   --   --   --   --   --   --  1.01   NA  --   --   --   --  135  --  134  --   --   --  134  --  134   POTASSIUM  --   --   --   --  4.2  --  4.1  --  4.7  --  3.8  --  3.0*   CHLORIDE  --   --   --   --  103  --  103  --   --   --  104  --  105   CO2  --   --   --   --  28  --  28  --   --   --  26  --  21   BUN  --   --   --   --  5*  --  5*  --   --   --  6*  --  10   CR  --   --   --   --  0.68  --  0.71  --   --   --  0.73  --  0.89   ANIONGAP  --   --   --   --  4  --  3  --   --   --  4  --  8   REBECA  --   --   --   --  9.5  --  9.3  --   --   --  10.2*  --  9.5   * 257* 218*   < > 95   < > 106*   < >  --    < > 61*   < > 42*   ALBUMIN  --   --   --   --   --   --  2.9*  --   --   --  3.1*  --  3.4   PROTTOTAL  --   --   --   --   --   --  5.0*  --   --   --  5.8*  --  6.5*   BILITOTAL  --   --   --   --   --   --  0.3  --   --   --  0.5  --  0.5   ALKPHOS  --   --   --   --   --   --  94  --   --   --  106  --  124   ALT  --   --   --   --   --   --  18  --   --   --  21  --  24   AST  --   --   --   --   --   --  14  --   --   --  24  --  24   LIPASE  --   --   --   --   --   --   --   --   --   --   --   --  47*    < > = values in this interval not displayed.     No results found for this or any previous visit (from the past 24 hour(s)).  Medications       amLODIPine  2.5 mg Oral Daily     atorvastatin  20 mg Oral Daily     citalopram  20 mg Oral Daily     folic acid  1 mg Oral Daily     insulin aspart  1-4 Units Subcutaneous Q4H     lisinopril  30 mg Oral Daily     multivitamin w/minerals   1 tablet Oral Daily     pantoprazole  20 mg Oral Daily     propranolol ER  60 mg Oral Daily     tamsulosin  0.4 mg Oral Daily     thiamine  100 mg Oral Daily     traZODone  50 mg Oral At Bedtime

## 2022-03-31 NOTE — CONSULTS
"Care Management Initial Consult    General Information  Assessment completed with: Patient,    Type of CM/SW Visit: Initial Assessment  SW spoke to pt about his sobriety, discharge needs, and support system.   Pt expresses remorse that he has \"screwed up\" 25 years of sobriety.  Pt states he is scared about his physical health and the damage he has caused to his relationships.  Pt states he has deep friendships within  and believes strongly in their mission and the support he has gotten from  for many years.  Pt cannot explain what might have lead him to abuse alcohol, but feels that he pulled back from his AA supports and regrets that.  Pt states he has informed his sponsor and friends within  that he is hospitalized and that he has been drinking and asking for their support to get sober again.  Pt declines wanting to talk to CD counselor, as he has a strong program that he is now motivated to engage in.  SW praised pt for reaching out for support and for building in some accountability for himself, as alcohol abuse can be challenging even after many years of sobriety.   SW spoke to pt about his spouse.  Pt has been  for 50 years, and says that his wife is very upset and he does not know if she will be there when he gets home.  Pt said he knows he has hurt her, and that he knows he must prove to her that he will \"work his program\" and that his words do not mean much to her since his actions lately have contradicted his words.  Pt says his spouse has been there through many ups and downs, and he intends to show her with his actions that he will do better.  Pt tearful that he has hurt his spouse and sad that she may not be there when he goes home.  SW provided emotional support to pt.  SW spoke to pt about medical needs at discharge.  Pt states he has all supplies and medications that he needs, and there is no financial issue in paying for them.  SW spoke to pt about having a friend stay with him at " discharge if his spouse will not be home.  SW/CC would like to have someone with pt for at least a couple of days since pt has had issues with both blood sugars as well as alcohol.  Pt states he will call his sponsor or friend from  to stay with him if his spouse will not be there.   Pt expresses gratitude for the supportive staff at [facility].        Primary Care Provider verified and updated as needed:     Readmission within the last 30 days:        Reason for Consult: discharge planning  Advance Care Planning:            Communication Assessment  Patient's communication style: spoken language (English or Bilingual)    Hearing Difficulty or Deaf: no   Wear Glasses or Blind: yes    Cognitive  Cognitive/Neuro/Behavioral: WDL  Level of Consciousness: alert  Arousal Level: opens eyes spontaneously  Orientation: oriented x 4  Mood/Behavior: calm          Living Environment:   People in home: spouse     Current living Arrangements:        Able to return to prior arrangements: yes       Family/Social Support:  Care provided by: spouse/significant other  Provides care for:    Marital Status:              Description of Support System:           Current Resources:   Patient receiving home care services:       Community Resources:    Equipment currently used at home: none  Supplies currently used at home:      Employment/Financial:  Employment Status:          Financial Concerns:             Lifestyle & Psychosocial Needs:  Social Determinants of Health     Tobacco Use: Not on file   Alcohol Use: Not on file   Financial Resource Strain: Not on file   Food Insecurity: Not on file   Transportation Needs: Not on file   Physical Activity: Not on file   Stress: Not on file   Social Connections: Not on file   Intimate Partner Violence: Not on file   Depression: Not on file   Housing Stability: Not on file       Functional Status:  Prior to admission patient needed assistance:              Mental Health Status:  Mental Health  Status: No Current Concerns       Chemical Dependency Status:  Chemical Dependency Status: Current Concern             Values/Beliefs:  Spiritual, Cultural Beliefs, Jew Practices, Values that affect care:                 Additional Information:      DICKSON KasperSW

## 2022-03-31 NOTE — PLAN OF CARE
IMC status discontinued. Pt alert and oriented x4. Vital signs stable on room air. Independent in room. Tolerating a regular diet. Lung sounds clear bilaterally . Bowel sounds normoactive, passing flatus. Last BM yesterday. Adequate urine output. Skin intact except extravasation site improving. Denies pain. Tele NSR with BBB. Blood sugars continue to improve. D10 was discontinued. BS checks every 4 hours. Plan to start lantus tomorrow and continue to monitor blood sugars.

## 2022-04-01 VITALS
WEIGHT: 195 LBS | SYSTOLIC BLOOD PRESSURE: 164 MMHG | TEMPERATURE: 97.8 F | OXYGEN SATURATION: 98 % | RESPIRATION RATE: 16 BRPM | DIASTOLIC BLOOD PRESSURE: 89 MMHG | HEIGHT: 71 IN | BODY MASS INDEX: 27.3 KG/M2 | HEART RATE: 107 BPM

## 2022-04-01 LAB
ANION GAP SERPL CALCULATED.3IONS-SCNC: 2 MMOL/L (ref 3–14)
BUN SERPL-MCNC: 10 MG/DL (ref 7–30)
CALCIUM SERPL-MCNC: 9.4 MG/DL (ref 8.5–10.1)
CHLORIDE BLD-SCNC: 102 MMOL/L (ref 94–109)
CO2 SERPL-SCNC: 29 MMOL/L (ref 20–32)
CREAT SERPL-MCNC: 0.7 MG/DL (ref 0.66–1.25)
GFR SERPL CREATININE-BSD FRML MDRD: >90 ML/MIN/1.73M2
GLUCOSE BLD-MCNC: 157 MG/DL (ref 70–99)
GLUCOSE BLDC GLUCOMTR-MCNC: 157 MG/DL (ref 70–99)
GLUCOSE BLDC GLUCOMTR-MCNC: 165 MG/DL (ref 70–99)
GLUCOSE BLDC GLUCOMTR-MCNC: 173 MG/DL (ref 70–99)
GLUCOSE BLDC GLUCOMTR-MCNC: 89 MG/DL (ref 70–99)
GLUCOSE BLDC GLUCOMTR-MCNC: 93 MG/DL (ref 70–99)
GLUCOSE BLDC GLUCOMTR-MCNC: 98 MG/DL (ref 70–99)
MAGNESIUM SERPL-MCNC: 2.2 MG/DL (ref 1.6–2.3)
PHOSPHATE SERPL-MCNC: 2.7 MG/DL (ref 2.5–4.5)
POTASSIUM BLD-SCNC: 4.5 MMOL/L (ref 3.4–5.3)
SODIUM SERPL-SCNC: 133 MMOL/L (ref 133–144)

## 2022-04-01 PROCEDURE — 84100 ASSAY OF PHOSPHORUS: CPT | Performed by: INTERNAL MEDICINE

## 2022-04-01 PROCEDURE — 80048 BASIC METABOLIC PNL TOTAL CA: CPT | Performed by: PHYSICIAN ASSISTANT

## 2022-04-01 PROCEDURE — 36415 COLL VENOUS BLD VENIPUNCTURE: CPT | Performed by: PHYSICIAN ASSISTANT

## 2022-04-01 PROCEDURE — 250N000013 HC RX MED GY IP 250 OP 250 PS 637: Performed by: HOSPITALIST

## 2022-04-01 PROCEDURE — 250N000013 HC RX MED GY IP 250 OP 250 PS 637: Performed by: PHYSICIAN ASSISTANT

## 2022-04-01 PROCEDURE — 99239 HOSP IP/OBS DSCHRG MGMT >30: CPT | Performed by: PHYSICIAN ASSISTANT

## 2022-04-01 PROCEDURE — 83735 ASSAY OF MAGNESIUM: CPT | Performed by: INTERNAL MEDICINE

## 2022-04-01 RX ORDER — LANCETS
EACH MISCELLANEOUS
Qty: 100 EACH | Refills: 0 | Status: SHIPPED | OUTPATIENT
Start: 2022-04-01

## 2022-04-01 RX ORDER — INSULIN ASPART 100 [IU]/ML
INJECTION, SOLUTION INTRAVENOUS; SUBCUTANEOUS
Qty: 15 ML | Refills: 3 | Status: SHIPPED | OUTPATIENT
Start: 2022-04-01

## 2022-04-01 RX ORDER — GLUCOSAMINE HCL/CHONDROITIN SU 500-400 MG
CAPSULE ORAL
Qty: 100 EACH | Refills: 3 | Status: SHIPPED | OUTPATIENT
Start: 2022-04-01

## 2022-04-01 RX ADMIN — AMLODIPINE BESYLATE 2.5 MG: 2.5 TABLET ORAL at 09:16

## 2022-04-01 RX ADMIN — FOLIC ACID 1 MG: 1 TABLET ORAL at 09:15

## 2022-04-01 RX ADMIN — CITALOPRAM HYDROBROMIDE 20 MG: 20 TABLET ORAL at 09:16

## 2022-04-01 RX ADMIN — ATORVASTATIN CALCIUM 20 MG: 10 TABLET, FILM COATED ORAL at 09:15

## 2022-04-01 RX ADMIN — INSULIN ASPART 1 UNITS: 100 INJECTION, SOLUTION INTRAVENOUS; SUBCUTANEOUS at 03:49

## 2022-04-01 RX ADMIN — MULTIPLE VITAMINS W/ MINERALS TAB 1 TABLET: TAB at 09:15

## 2022-04-01 RX ADMIN — THIAMINE HCL TAB 100 MG 100 MG: 100 TAB at 09:15

## 2022-04-01 RX ADMIN — TAMSULOSIN HYDROCHLORIDE 0.4 MG: 0.4 CAPSULE ORAL at 09:16

## 2022-04-01 RX ADMIN — LISINOPRIL 30 MG: 10 TABLET ORAL at 09:15

## 2022-04-01 RX ADMIN — METFORMIN HYDROCHLORIDE 1000 MG: 500 TABLET ORAL at 09:15

## 2022-04-01 RX ADMIN — PANTOPRAZOLE SODIUM 20 MG: 20 TABLET, DELAYED RELEASE ORAL at 09:16

## 2022-04-01 RX ADMIN — INSULIN ASPART 1 UNITS: 100 INJECTION, SOLUTION INTRAVENOUS; SUBCUTANEOUS at 07:46

## 2022-04-01 RX ADMIN — PROPRANOLOL HYDROCHLORIDE 60 MG: 60 CAPSULE, EXTENDED RELEASE ORAL at 09:16

## 2022-04-01 ASSESSMENT — ACTIVITIES OF DAILY LIVING (ADL)
ADLS_ACUITY_SCORE: 8

## 2022-04-01 NOTE — PLAN OF CARE
Neuro: A&O x4,scoring 0 on CIWA   Tele/cardiac: off tele  Respiration: on RA  Activity: independent  Pain: neck pain advil administered with relief  Drips: 1 PIV SL  Drains/tubes: none  Skin: intact  GI/: voiding adequately in urinal  Aggression color: green  Isolation: none  Plan: Started on sliding scale insulin

## 2022-04-01 NOTE — DISCHARGE SUMMARY
[facility]  Hospitalist Discharge Summary      Date of Admission:  3/28/2022  Date of Discharge:  4/1/2022  Discharging Provider: Madina Schultz PA-C  Discharge Service: Hospitalist Service    Discharge Diagnoses   Symptomatic hypoglycemia  Diabetes mellitus type 2, poorly controlled  Lactic acidosis secondary to likely alcohol related acidosis, resolved  LUE extravasation of D50w  Alcohol use disorder with acute alcohol intoxication  Benign essential hypertension  Hallucinations, resolved  Hypokalemia, resolved  Leukocytosis, resolved:    Follow-ups Needed After Discharge   Follow-up Appointments     Follow-up and recommended labs and tests       Appointment with Dr Chai Nathan  4/6 at 10: 30 am         Follow-up and recommended labs and tests       Follow up with primary care provider, [facility],   within 7 days for hospital follow- up and medication change.  The   following labs/tests are recommended: repeat HbA1c in 3-6 months. Follow   up on insulin regimen, glycemic control, and recheck blood pressure. May   need to increase amlodipine as he was elevated in the hospital. Recheck   CBC, hemoglobin was low in the hospital. Consider discontinuing   propranolol as can prolong insulin effects/hypoglycemia.    Follow up with AA and your support system. Do not drink any alcohol.   Continue with your sobriety and congratulations on refocusing on being   sober.             Discharge Disposition   Discharged to home  Condition at discharge: Stable    Hospital Course   Humberto Espinoza is a 71 year old male who was admitted with symptomatic hypoglycemia. For full HPI please see admission H&P from Dr. Nakita Greenwood dated 3/28/22.     Symptomatic Hypoglycemia  Diabetes mellitus type 2  Lactic acidosis secondary to likely alcohol related acidosis, resolved  PTA novolin 70/30 20 units BID and metformin. He has been decreasing his dose of novolin lately due to lower BGs.  States his goal is BG <180. Requiring D10 in ED to maintain BGs 80-90s. Last novolin was 3/28 AM 30 units. Previously on Lantus. Buys his insulin OTC at [facility]. Denies regimen changes recently. Unclear when he switched from Lantus to Novolog. +CGM, Freestyle lizbet. HbA1c 8%.    Patient was found to have elevated lactic acid level thought to be secondary to Metformin versus hypoglycemia, normalized on recheck.  He did not look acutely ill or septic.  No antibiotics were started.  His urinalysis was within normal limits.  He also leukocytosis is as below which resolved without antibiotic therapy and thought to be due to stress demargination.    It was suspected the hypoglycemia driven by exogenous insulin dosing in the setting of use of listerine for EtOH ingestion, stress at home, and likely decreased glycogen/gluconeogenesis effects in liver with history of EtOH use/abuse.    On 3/29 he was transferred to [place] for close monitoring given repetitive hypoglycemic episodes.  D10W was started and titrated up to 200 mL/h.  Is started on regular diet and encouraged to eat carbohydrates.  He was able to wean off his dextrose drip the day prior to discharge and remained stable on low-dose sliding scale insulin.  His Metformin was restarted at home dose and he was discharged on low-dose Humalog sliding scale.  He was scheduled for primary care appointment next week for follow-up of diabetic regimen and blood pressure.  He will follow closely with primary care they will consider endocrine consultation if needed.  He will continue with his continuous glucose monitor, he has a glucometer at home however he does not have strips or lancets therefore these were filled during admission.       Recent Labs   Lab 04/01/22  0720 04/01/22  0529 04/01/22  0347 03/31/22  2334 03/31/22 2013 03/31/22  1642   * 157* 173* 214* 227* 189*        LUE extravasation of D50w  Reportedly extravasation of D50w on 3/29. Orderset placed and  given dose of hyaluronidase per protocol. Minimal tenderness to area of Lt forearm. No warmth, erythema, or signs of tissue necrosis. Monitored, improving     Alcohol use disorder  Alcohol intoxication  Currently drinking 16oz mouthwash daily for the last 14 days. Alcohol level 0.1 on admit.  CIWA was monitored closely and he did not require any benzodiazepine treatment.  He has a strong support system with AA and a mentor and is motivated to follow-up and seek alcohol cessation.     Hypertension: Continue PTA amlodipine, propranolol, lisinopril with hold parameters     Resolved Hospital Problems  Hallucinations, resolved: Likely secondary to hypoglycemia, resolved in ED  Hypokalemia, resolved: Replete per protocol  Leukocytosis, resolved: May be reactive. Patient denies fevers or chills. Endorses sweatiness when he feels hypoglycemic. No N/V. Denies CP, abd pain, urinary symptoms. PCT checked and negative. Resolved off antibiotics     Other pertinent medical history and chronic stable diagnoses:   BPH: UA WNL. Continue flomax  Depression: Continue PTA celexa, trazodone at bedtime  GERD, Peptic ulcer disease, Hx partial gastrectomy: Continue PTA omeprazole  Hyperlipidemia: Continue PTA statin        Consultations This Hospital Stay   CARE MANAGEMENT / SOCIAL WORK IP CONSULT    Code Status   Full Code    Time Spent on this Encounter   I, Madina Schultz PA-C, personally saw the patient today and spent greater than 30 minutes discharging this patient.       Madina Schultz PA-C  [address] [place] [address]  Phone: [phone]  ______________________________________________________________________    Physical Exam   Vital Signs: Temp: 97.8  F (36.6  C) Temp src: Oral BP: (!) 164/89 Pulse: 107   Resp: 16 SpO2: 98 % O2 Device: None (Room air)    Weight: 195 lbs 0 oz    General: Awake, alert, very pleasant man who appears stated age. Looks comfortable  sitting up in bed. No acute distress.  HEENT: Normocephalic, atraumatic. Extraocular movements intact.   Respiratory: Clear to auscultation bilaterally, no rales, wheezing, or rhonchi.  Cardiovascular: Regular rate and rhythm, +S1 and S2, no murmur auscultated. No peripheral edema.   Gastrointestinal: Soft, non-tender, obese but non-distended. Bowel sounds present.  Skin: Warm, dry. No obvious rashes or lesions on exposed skin. Dorsalis pedis pulses palpable bilaterally.  Musculoskeletal: No joint swelling, erythema or tenderness.  Neurologic: AAO x3.  Moves all extremities equally.  Psychiatric: Appropriate mood and affect. No obvious anxiety or depression.       Primary Care Physician   [facility]    Discharge Orders      Follow-up and recommended labs and tests     Appointment with Dr Chai Nathan  4/6 at 10: 30 am     Reason for your hospital stay    You were admitted with low blood sugar.     Activity    Your activity upon discharge: activity as tolerated     Follow-up and recommended labs and tests     Follow up with primary care provider, [facility], within 7 days for hospital follow- up and medication change.  The following labs/tests are recommended: repeat HbA1c in 3-6 months. Follow up on insulin regimen, glycemic control, and recheck blood pressure. May need to increase amlodipine as he was elevated in the hospital. Recheck CBC, hemoglobin was low in the hospital. Consider discontinuing propranolol as can prolong insulin effects/hypoglycemia.    Follow up with AA and your support system. Do not drink any alcohol. Continue with your sobriety and congratulations on refocusing on being sober.     Monitor and record    blood glucose 4 times a day, before meals and at bedtime and report findings to PCP.  blood pressure every day or two if able, record and give log to PCP  STOP taking your home insulin, 70/30 for now.     Diet    Follow this diet upon discharge:  Regular Diet Adult       Significant Results and Procedures   Results for orders placed or performed during the hospital encounter of 09/13/20   US Lower Extremity Venous Duplex Left    Narrative    US LOWER EXTREMITY VENOUS DUPLEX LEFT 9/13/2020 9:24 PM    CLINICAL HISTORY: Left lower extremity swelling  TECHNIQUE: Venous Duplex ultrasound of the left lower extremity with  and without compression, augmentation and duplex. Color flow and  spectral Doppler with waveform analysis performed.    COMPARISON: None.    FINDINGS: Exam includes the common femoral, femoral, popliteal, and  contralateral common femoral veins as well as segmentally visualized  deep calf veins and greater saphenous vein.     LEFT: No deep vein thrombosis. No superficial thrombophlebitis. No  popliteal cyst.      Impression    IMPRESSION:  1.  No deep venous thrombosis in the left lower extremity.    LORETTA BOCANEGRA MD   US Testicular & Scrotum w Doppler Ltd    Narrative    Ultrasound testicular and scrotum with Doppler limited 9/13/2020    CLINICAL INDICATION: Testicular swelling without pain.    COMPARISON: None.    FINDINGS:    Right testis is homogeneous in echotexture measuring 4.8 x 2.9 x 3.1  cm. Preservation of the intratesticular arterial and venous waveforms.  Right epididymis is unremarkable. Moderate right hydrocele. Note is  made of an appendix testis.    Left testis is homogeneous in echotexture measuring 4.4 x 3.6 x 2.0  cm. Preservation of the intratesticular arterial and venous waveforms.  Left epididymis is unremarkable. Small left hydrocele. Note is made of  an appendix testis.      Impression    IMPRESSION:  1. Moderate right and small to moderate left hydroceles.  2. No testicular torsion or acute epididymoorchitis.    LORETTA BOCANEGRA MD   CT Head w/o Contrast    Narrative    CT SCAN OF THE HEAD WITHOUT CONTRAST   9/13/2020 8:48 PM     HISTORY: Intoxication, fell, hit back of head    TECHNIQUE:  Axial images of the head and  coronal reformations without  IV contrast material. Radiation dose for this scan was reduced using  automated exposure control, adjustment of the mA and/or kV according  to patient size, or iterative reconstruction technique.    COMPARISON: None.    FINDINGS:      The brain parenchymal volume and the ventricular size and morphology  are within normal limits for age. No secondary features of increased  intraventricular or intracranial pressure. No significant white matter  change.  No territorial gray-white differentiation loss or obscuration  of the basal ganglia to suggest acute/subacute infarction.No  intracranial hemorrhage or extra-axial fluid collection. No evidence  for mass, mass effect, shift of midline or basal cistern effacement.   The midline structures and the posterior fossa structures are normal.    The paranasal sinuses are clear. Temporal bone structures are clear.  No destructive/aggressive appearing lesion involving the skull base or  cranium. The orbits are unremarkable.      Impression    IMPRESSION:     1. Unremarkable head CT without acute/subacute intracranial  abnormality.    KAREN CHENG MD       Discharge Medications   Discharge Medication List as of 4/1/2022 11:31 AM      START taking these medications    Details   alcohol swab prep pads Use to swab area of injection/boone as directedDisp-100 each, [id number]      blood glucose (NO BRAND SPECIFIED) test strip Use to test blood sugar 4 times daily or as directed., Disp-100 strip, R-6, E-PrescribeTo accompany: glucometer per insurance.      blood glucose calibration (NO BRAND SPECIFIED) solution Use to calibrate blood glucose monitor as needed as directed., Disp-1 each, R-0, E-PrescribeTo accompany: per insurance.      insulin aspart (NOVOLOG FLEXPEN) 100 UNIT/ML pen Give before meals and before bed: For Pre-meal glucose: 140 - 239 give 1 unit, 240 - 339 give 2 units, > 340 give 3 units. For Bedtime Glucose: 200 - 339 give 1 unit, >  340 give 2 units., Disp-15 mL, R-3, E-PrescribeFuture refills by PCP [facility] with phone number [phone].      thin (NO BRAND SPECIFIED) lancets Use to test blood sugar 4 times daily or as directed., Disp-100 each, R-0, E-PrescribeTo accompany: glucometer per insurance.         CONTINUE these medications which have NOT CHANGED    Details   amLODIPine (NORVASC) 2.5 MG tablet Take 2.5 mg by mouth daily, Historical      atorvastatin (LIPITOR) 20 MG tablet Take 20 mg by mouth daily, Historical      citalopram (CELEXA) 20 MG tablet Take 20 mg by mouth daily, Historical      lisinopril (ZESTRIL) 30 MG tablet Take 30 mg by mouth daily, Historical      metFORMIN (GLUCOPHAGE) 1000 MG tablet Take 1,000 mg by mouth 2 times daily (with meals), Historical      omeprazole (PRILOSEC) 20 MG DR capsule Take 20 mg by mouth daily, Historical      propranolol ER (INDERAL LA) 60 MG 24 hr capsule Take 60 mg by mouth daily, Historical      tamsulosin (FLOMAX) 0.4 MG capsule Take 0.4 mg by mouth daily, Historical      traZODone (DESYREL) 50 MG tablet Take 50 mg by mouth At Bedtime, Historical         STOP taking these medications       Insulin NPH Isophane & Regular (NOVOLIN 70/30 FLEXPEN SC) Comments:   Reason for Stopping:             Allergies   No Known Allergies

## 2022-04-01 NOTE — PLAN OF CARE
Goal Outcome Evaluation:    Plan of Care Reviewed With: patient     Overall Patient Progress: improving    A&Ox 4. VSS on RA. IV removed. Lung sounds clear. Bowel sounds active. Up independent. Denies pain. Regular diet.      AVS printed and reviewed with patient. Questions encouraged and answered. Patient verbalized understanding. Patient received mediations from pharmacy. Patient discharged of unit in stable condition.

## 2022-04-02 DIAGNOSIS — Z71.89 OTHER SPECIFIED COUNSELING: ICD-10-CM

## 2022-04-03 ENCOUNTER — PATIENT OUTREACH (OUTPATIENT)
Dept: CARE COORDINATION | Facility: CLINIC | Age: 72
End: 2022-04-03
Payer: COMMERCIAL

## 2022-04-03 NOTE — PROGRESS NOTES
Clinic Care Coordination Contact  [facility]/Voicemail    Clinical Data: Care Coordinator Outreach  Outreach attempted x 1. Left message on patient's voicemail with call back information and requested return call.     Plan:Care Coordinator will try to reach patient again in 1-2 business days.    RAMESH Lunsford  [phone]  [facility]

## 2022-04-04 NOTE — PROGRESS NOTES
Clinic Care Coordination Contact  [facility]/Voicemail       Clinical Data: Care Coordinator Outreach Outreach attempted x 2.  Left message on patient's voicemail with call back information and requested return call.  Plan: Care Coordinator will do no further outreaches at this time.    Madelaine Andres  Community Health Worker  [facility]  Ph:[phone]